# Patient Record
Sex: FEMALE | Race: WHITE | NOT HISPANIC OR LATINO | Employment: UNEMPLOYED | URBAN - METROPOLITAN AREA
[De-identification: names, ages, dates, MRNs, and addresses within clinical notes are randomized per-mention and may not be internally consistent; named-entity substitution may affect disease eponyms.]

---

## 2018-03-05 ENCOUNTER — HOSPITAL ENCOUNTER (EMERGENCY)
Facility: HOSPITAL | Age: 28
Discharge: HOME | End: 2018-03-05
Attending: EMERGENCY MEDICINE
Payer: MEDICAID

## 2018-03-05 VITALS
DIASTOLIC BLOOD PRESSURE: 81 MMHG | OXYGEN SATURATION: 97 % | SYSTOLIC BLOOD PRESSURE: 125 MMHG | TEMPERATURE: 98 F | HEIGHT: 62 IN | WEIGHT: 145 LBS | RESPIRATION RATE: 17 BRPM | BODY MASS INDEX: 26.68 KG/M2 | HEART RATE: 89 BPM

## 2018-03-05 DIAGNOSIS — E86.0 DEHYDRATION WITH HYPONATREMIA: ICD-10-CM

## 2018-03-05 DIAGNOSIS — R11.2 NON-INTRACTABLE VOMITING WITH NAUSEA, UNSPECIFIED VOMITING TYPE: Primary | ICD-10-CM

## 2018-03-05 DIAGNOSIS — E87.1 DEHYDRATION WITH HYPONATREMIA: ICD-10-CM

## 2018-03-05 LAB
ALBUMIN SERPL BCP-MCNC: 4.3 G/DL
ALP SERPL-CCNC: 72 U/L
ALT SERPL W/O P-5'-P-CCNC: 18 U/L
ANION GAP SERPL CALC-SCNC: 13 MMOL/L
AST SERPL-CCNC: 28 U/L
B-HCG UR QL: NEGATIVE
BASOPHILS # BLD AUTO: 0.07 K/UL
BASOPHILS NFR BLD: 0.4 %
BILIRUB SERPL-MCNC: 0.6 MG/DL
BILIRUB UR QL STRIP: NEGATIVE
BUN SERPL-MCNC: 18 MG/DL
CALCIUM SERPL-MCNC: 9.3 MG/DL
CHLORIDE SERPL-SCNC: 92 MMOL/L
CLARITY UR: CLEAR
CO2 SERPL-SCNC: 24 MMOL/L
COLOR UR: YELLOW
CREAT SERPL-MCNC: 0.8 MG/DL
DIFFERENTIAL METHOD: ABNORMAL
EOSINOPHIL # BLD AUTO: 0.1 K/UL
EOSINOPHIL NFR BLD: 0.9 %
ERYTHROCYTE [DISTWIDTH] IN BLOOD BY AUTOMATED COUNT: 14.2 %
EST. GFR  (AFRICAN AMERICAN): >60 ML/MIN/1.73 M^2
EST. GFR  (NON AFRICAN AMERICAN): >60 ML/MIN/1.73 M^2
GLUCOSE SERPL-MCNC: 108 MG/DL
GLUCOSE UR QL STRIP: NEGATIVE
HCT VFR BLD AUTO: 47.1 %
HGB BLD-MCNC: 16.5 G/DL
HGB UR QL STRIP: NEGATIVE
INR PPP: 1
KETONES UR QL STRIP: NEGATIVE
LEUKOCYTE ESTERASE UR QL STRIP: NEGATIVE
LIPASE SERPL-CCNC: 31 U/L
LYMPHOCYTES # BLD AUTO: 4.6 K/UL
LYMPHOCYTES NFR BLD: 28.7 %
MCH RBC QN AUTO: 29.2 PG
MCHC RBC AUTO-ENTMCNC: 35 G/DL
MCV RBC AUTO: 83 FL
MONOCYTES # BLD AUTO: 1.4 K/UL
MONOCYTES NFR BLD: 8.4 %
NEUTROPHILS # BLD AUTO: 9.9 K/UL
NEUTROPHILS NFR BLD: 61.6 %
NITRITE UR QL STRIP: NEGATIVE
PH UR STRIP: 7 [PH] (ref 5–8)
PLATELET # BLD AUTO: 348 K/UL
PMV BLD AUTO: 11.5 FL
POTASSIUM SERPL-SCNC: 4 MMOL/L
PROT SERPL-MCNC: 7.6 G/DL
PROT UR QL STRIP: NEGATIVE
PROTHROMBIN TIME: 10.7 SEC
RBC # BLD AUTO: 5.66 M/UL
SODIUM SERPL-SCNC: 129 MMOL/L
SP GR UR STRIP: 1.01 (ref 1–1.03)
URN SPEC COLLECT METH UR: NORMAL
UROBILINOGEN UR STRIP-ACNC: 1 EU/DL
WBC # BLD AUTO: 16.02 K/UL

## 2018-03-05 PROCEDURE — 99283 EMERGENCY DEPT VISIT LOW MDM: CPT | Mod: 25

## 2018-03-05 PROCEDURE — 96361 HYDRATE IV INFUSION ADD-ON: CPT

## 2018-03-05 PROCEDURE — 63600175 PHARM REV CODE 636 W HCPCS: Performed by: NURSE PRACTITIONER

## 2018-03-05 PROCEDURE — 96374 THER/PROPH/DIAG INJ IV PUSH: CPT

## 2018-03-05 PROCEDURE — 25000003 PHARM REV CODE 250: Performed by: NURSE PRACTITIONER

## 2018-03-05 PROCEDURE — 81025 URINE PREGNANCY TEST: CPT

## 2018-03-05 PROCEDURE — 83690 ASSAY OF LIPASE: CPT

## 2018-03-05 PROCEDURE — 85025 COMPLETE CBC W/AUTO DIFF WBC: CPT

## 2018-03-05 PROCEDURE — 81003 URINALYSIS AUTO W/O SCOPE: CPT

## 2018-03-05 PROCEDURE — 85610 PROTHROMBIN TIME: CPT

## 2018-03-05 PROCEDURE — 80053 COMPREHEN METABOLIC PANEL: CPT

## 2018-03-05 RX ORDER — ONDANSETRON 4 MG/1
4 TABLET, FILM COATED ORAL EVERY 8 HOURS PRN
Qty: 12 TABLET | Refills: 0 | Status: SHIPPED | OUTPATIENT
Start: 2018-03-05 | End: 2018-12-31

## 2018-03-05 RX ORDER — CLONIDINE 0.2 MG/24H
1 PATCH, EXTENDED RELEASE TRANSDERMAL
COMMUNITY
End: 2018-12-31

## 2018-03-05 RX ORDER — SODIUM CHLORIDE 9 MG/ML
1000 INJECTION, SOLUTION INTRAVENOUS
Status: COMPLETED | OUTPATIENT
Start: 2018-03-05 | End: 2018-03-05

## 2018-03-05 RX ORDER — ONDANSETRON 2 MG/ML
8 INJECTION INTRAMUSCULAR; INTRAVENOUS
Status: COMPLETED | OUTPATIENT
Start: 2018-03-05 | End: 2018-03-05

## 2018-03-05 RX ORDER — AMLODIPINE BESYLATE 5 MG/1
5 TABLET ORAL DAILY
COMMUNITY
End: 2018-12-31

## 2018-03-05 RX ADMIN — SODIUM CHLORIDE 1000 ML: 0.9 INJECTION, SOLUTION INTRAVENOUS at 04:03

## 2018-03-05 RX ADMIN — ONDANSETRON 8 MG: 2 INJECTION INTRAMUSCULAR; INTRAVENOUS at 04:03
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
## 2018-09-04 ENCOUNTER — HOSPITAL ENCOUNTER (EMERGENCY)
Facility: HOSPITAL | Age: 28
Discharge: HOME | End: 2018-09-05
Attending: EMERGENCY MEDICINE
Payer: MEDICAID

## 2018-09-04 DIAGNOSIS — T40.1X1A ACCIDENTAL OVERDOSE OF HEROIN, INITIAL ENCOUNTER: Primary | ICD-10-CM

## 2018-09-04 DIAGNOSIS — J96.90 RESPIRATORY FAILURE: ICD-10-CM

## 2018-09-04 DIAGNOSIS — J96.01 ACUTE RESPIRATORY FAILURE WITH HYPOXIA: ICD-10-CM

## 2018-09-04 DIAGNOSIS — T50.901A OVERDOSE: ICD-10-CM

## 2018-09-04 PROCEDURE — 96374 THER/PROPH/DIAG INJ IV PUSH: CPT

## 2018-09-04 PROCEDURE — 99285 EMERGENCY DEPT VISIT HI MDM: CPT | Mod: 25

## 2018-09-04 PROCEDURE — 80053 COMPREHEN METABOLIC PANEL: CPT

## 2018-09-04 PROCEDURE — 63600175 PHARM REV CODE 636 W HCPCS: Performed by: EMERGENCY MEDICINE

## 2018-09-04 PROCEDURE — 93010 ELECTROCARDIOGRAM REPORT: CPT | Mod: ,,, | Performed by: INTERNAL MEDICINE

## 2018-09-04 PROCEDURE — 25000003 PHARM REV CODE 250: Performed by: EMERGENCY MEDICINE

## 2018-09-04 PROCEDURE — 85025 COMPLETE CBC W/AUTO DIFF WBC: CPT

## 2018-09-04 PROCEDURE — 63600175 PHARM REV CODE 636 W HCPCS

## 2018-09-04 PROCEDURE — 93005 ELECTROCARDIOGRAM TRACING: CPT

## 2018-09-04 RX ORDER — NALOXONE HCL 0.4 MG/ML
2 VIAL (ML) INJECTION
Status: COMPLETED | OUTPATIENT
Start: 2018-09-04 | End: 2018-09-04

## 2018-09-04 RX ADMIN — NALOXONE HYDROCHLORIDE 2 MG: 0.4 INJECTION, SOLUTION INTRAMUSCULAR; INTRAVENOUS; SUBCUTANEOUS at 11:09

## 2018-09-04 RX ADMIN — SODIUM CHLORIDE 1000 ML: 0.9 INJECTION, SOLUTION INTRAVENOUS at 11:09

## 2018-09-05 VITALS
OXYGEN SATURATION: 94 % | HEART RATE: 75 BPM | TEMPERATURE: 99 F | DIASTOLIC BLOOD PRESSURE: 73 MMHG | SYSTOLIC BLOOD PRESSURE: 131 MMHG | RESPIRATION RATE: 16 BRPM

## 2018-09-05 LAB
ALBUMIN SERPL BCP-MCNC: 3.6 G/DL
ALP SERPL-CCNC: 71 U/L
ALT SERPL W/O P-5'-P-CCNC: 16 U/L
AMPHET+METHAMPHET UR QL: NEGATIVE
ANION GAP SERPL CALC-SCNC: 15 MMOL/L
AST SERPL-CCNC: 24 U/L
B-HCG UR QL: NEGATIVE
BARBITURATES UR QL SCN>200 NG/ML: NEGATIVE
BASOPHILS # BLD AUTO: 0.06 K/UL
BASOPHILS NFR BLD: 0.6 %
BENZODIAZ UR QL SCN>200 NG/ML: NEGATIVE
BILIRUB SERPL-MCNC: 0.2 MG/DL
BILIRUB UR QL STRIP: NEGATIVE
BUN SERPL-MCNC: 10 MG/DL
BZE UR QL SCN: NEGATIVE
CALCIUM SERPL-MCNC: 8.3 MG/DL
CANNABINOIDS UR QL SCN: NEGATIVE
CHLORIDE SERPL-SCNC: 107 MMOL/L
CLARITY UR: CLEAR
CO2 SERPL-SCNC: 19 MMOL/L
COLOR UR: YELLOW
CREAT SERPL-MCNC: 0.8 MG/DL
CREAT UR-MCNC: 7.2 MG/DL
DIFFERENTIAL METHOD: ABNORMAL
EOSINOPHIL # BLD AUTO: 0.2 K/UL
EOSINOPHIL NFR BLD: 2.1 %
ERYTHROCYTE [DISTWIDTH] IN BLOOD BY AUTOMATED COUNT: 16 %
EST. GFR  (AFRICAN AMERICAN): >60 ML/MIN/1.73 M^2
EST. GFR  (NON AFRICAN AMERICAN): >60 ML/MIN/1.73 M^2
GLUCOSE SERPL-MCNC: 130 MG/DL
GLUCOSE UR QL STRIP: NEGATIVE
HCT VFR BLD AUTO: 39.2 %
HGB BLD-MCNC: 12.7 G/DL
HGB UR QL STRIP: ABNORMAL
KETONES UR QL STRIP: NEGATIVE
LEUKOCYTE ESTERASE UR QL STRIP: ABNORMAL
LYMPHOCYTES # BLD AUTO: 5.1 K/UL
LYMPHOCYTES NFR BLD: 50.4 %
MCH RBC QN AUTO: 25.3 PG
MCHC RBC AUTO-ENTMCNC: 32.4 G/DL
MCV RBC AUTO: 78 FL
METHADONE UR QL SCN>300 NG/ML: NEGATIVE
MICROSCOPIC COMMENT: NORMAL
MONOCYTES # BLD AUTO: 0.6 K/UL
MONOCYTES NFR BLD: 5.9 %
NEUTROPHILS # BLD AUTO: 4.1 K/UL
NEUTROPHILS NFR BLD: 41 %
NITRITE UR QL STRIP: NEGATIVE
OPIATES UR QL SCN: ABNORMAL
PCP UR QL SCN>25 NG/ML: NEGATIVE
PH UR STRIP: 7 [PH] (ref 5–8)
PLATELET # BLD AUTO: 339 K/UL
PMV BLD AUTO: 10.4 FL
POTASSIUM SERPL-SCNC: 3.2 MMOL/L
PROT SERPL-MCNC: 7 G/DL
PROT UR QL STRIP: ABNORMAL
RBC # BLD AUTO: 5.02 M/UL
RBC #/AREA URNS HPF: 4 /HPF (ref 0–4)
SODIUM SERPL-SCNC: 141 MMOL/L
SP GR UR STRIP: <=1.005 (ref 1–1.03)
SQUAMOUS #/AREA URNS HPF: 2 /HPF
TOXICOLOGY INFORMATION: ABNORMAL
URN SPEC COLLECT METH UR: ABNORMAL
UROBILINOGEN UR STRIP-ACNC: NEGATIVE EU/DL
WBC # BLD AUTO: 10.01 K/UL
WBC #/AREA URNS HPF: 1 /HPF (ref 0–5)

## 2018-09-05 PROCEDURE — 80307 DRUG TEST PRSMV CHEM ANLYZR: CPT

## 2018-09-05 PROCEDURE — 81000 URINALYSIS NONAUTO W/SCOPE: CPT | Mod: 59

## 2018-09-05 PROCEDURE — 81025 URINE PREGNANCY TEST: CPT
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
## 2018-10-19 ENCOUNTER — HOSPITAL ENCOUNTER (EMERGENCY)
Facility: HOSPITAL | Age: 28
Discharge: HOME | End: 2018-10-19
Attending: EMERGENCY MEDICINE
Payer: MEDICAID

## 2018-10-19 VITALS
OXYGEN SATURATION: 98 % | DIASTOLIC BLOOD PRESSURE: 96 MMHG | TEMPERATURE: 98 F | HEART RATE: 84 BPM | RESPIRATION RATE: 18 BRPM | SYSTOLIC BLOOD PRESSURE: 168 MMHG

## 2018-10-19 DIAGNOSIS — R10.2 PELVIC CRAMPING: Primary | ICD-10-CM

## 2018-10-19 LAB
B-HCG UR QL: NEGATIVE
BACTERIA #/AREA URNS HPF: NORMAL /HPF
BILIRUB UR QL STRIP: NEGATIVE
CLARITY UR: CLEAR
COLOR UR: YELLOW
GLUCOSE UR QL STRIP: NEGATIVE
HGB UR QL STRIP: NEGATIVE
KETONES UR QL STRIP: NEGATIVE
LEUKOCYTE ESTERASE UR QL STRIP: ABNORMAL
MICROSCOPIC COMMENT: NORMAL
NITRITE UR QL STRIP: NEGATIVE
PH UR STRIP: 6 [PH] (ref 5–8)
PROT UR QL STRIP: NEGATIVE
SP GR UR STRIP: <=1.005 (ref 1–1.03)
SQUAMOUS #/AREA URNS HPF: 5 /HPF
URN SPEC COLLECT METH UR: ABNORMAL
UROBILINOGEN UR STRIP-ACNC: NEGATIVE EU/DL
WBC #/AREA URNS HPF: 3 /HPF (ref 0–5)

## 2018-10-19 PROCEDURE — 81025 URINE PREGNANCY TEST: CPT

## 2018-10-19 PROCEDURE — 99283 EMERGENCY DEPT VISIT LOW MDM: CPT

## 2018-10-19 PROCEDURE — 81000 URINALYSIS NONAUTO W/SCOPE: CPT

## 2018-10-19 RX ORDER — METOCLOPRAMIDE 10 MG/1
10 TABLET ORAL EVERY 6 HOURS PRN
Qty: 20 TABLET | Refills: 0 | Status: SHIPPED | OUTPATIENT
Start: 2018-10-19 | End: 2018-12-31
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
## 2018-10-19 NOTE — ED NOTES
Bed:  02  Expected date:   Expected time:   Means of arrival:   Comments:     Giuseppe Mo RN  10/19/18 1030

## 2018-10-23 ENCOUNTER — TELEPHONE (OUTPATIENT)
Dept: OBSTETRICS AND GYNECOLOGY | Facility: CLINIC | Age: 28
End: 2018-10-23

## 2018-10-23 DIAGNOSIS — Z34.90 EARLY STAGE OF PREGNANCY: Primary | ICD-10-CM

## 2018-10-23 NOTE — TELEPHONE ENCOUNTER
Spoke to patient. Patient is requesting hcg bloodwork orders, thinks she may be pregnant. Patient is bleeding now but has had periods during pregnancy in the past and also has had negative UPT during pregnancy. Advised patient I would send a message to see about getting orders for the lab and call her back. Patient verbalized understanding.

## 2018-10-23 NOTE — TELEPHONE ENCOUNTER
----- Message from Yara Sharpjose alejandro sent at 10/23/2018  4:24 PM CDT -----  Contact: self [phone]  States that she thinks she may be pregnant but from past pregnancy she will not test positive with a urine pregnacy. States that she would like a pregnancy blood test order. States that she is also on her period now but has had periods when pregnant in the past. Please call back at [phone]//thank you acc

## 2018-10-24 ENCOUNTER — TELEPHONE (OUTPATIENT)
Dept: OBSTETRICS AND GYNECOLOGY | Facility: CLINIC | Age: 28
End: 2018-10-24

## 2018-10-24 NOTE — TELEPHONE ENCOUNTER
Spoke to patient. Assisted patient with scheduling lab appointment for 10/25/18. Patient verbalized understanding.

## 2018-10-24 NOTE — TELEPHONE ENCOUNTER
----- Message from Alia Torres sent at 10/24/2018  3:34 PM CDT -----  Pt at [phone]//states she is returning your call//please call again//thanks/kenny

## 2018-10-24 NOTE — TELEPHONE ENCOUNTER
Attempted to call patient to notify her that Dr. Giordano put the orders in for labwork and we can schedule her. No answer, no voicemail. Unable to leave message.

## 2018-10-25 ENCOUNTER — LAB VISIT (OUTPATIENT)
Dept: LAB | Facility: HOSPITAL | Age: 28
End: 2018-10-25
Attending: OBSTETRICS & GYNECOLOGY
Payer: MEDICAID

## 2018-10-25 DIAGNOSIS — Z34.90 EARLY STAGE OF PREGNANCY: ICD-10-CM

## 2018-10-25 LAB — HCG INTACT+B SERPL-ACNC: <1.2 MIU/ML

## 2018-10-25 PROCEDURE — 84702 CHORIONIC GONADOTROPIN TEST: CPT

## 2018-10-25 PROCEDURE — 36415 COLL VENOUS BLD VENIPUNCTURE: CPT

## 2018-10-26 ENCOUNTER — PATIENT MESSAGE (OUTPATIENT)
Dept: OBSTETRICS AND GYNECOLOGY | Facility: CLINIC | Age: 28
End: 2018-10-26

## 2018-11-08 ENCOUNTER — TELEPHONE (OUTPATIENT)
Dept: OBSTETRICS AND GYNECOLOGY | Facility: CLINIC | Age: 28
End: 2018-11-08

## 2018-12-27 ENCOUNTER — TELEPHONE (OUTPATIENT)
Dept: OBSTETRICS AND GYNECOLOGY | Facility: CLINIC | Age: 28
End: 2018-12-27

## 2018-12-27 NOTE — TELEPHONE ENCOUNTER
Pt. Called and she has an appointment today to see ANNE Cee at 3:30. Patient wanted to know if she can come later at 4:00pm. Let patient know that we do not have any later appointments. Offered to reschedule to a different day. Patient declined and will keep her appointment for today at 3:30pm.

## 2018-12-31 ENCOUNTER — LAB VISIT (OUTPATIENT)
Dept: LAB | Facility: HOSPITAL | Age: 28
End: 2018-12-31
Attending: ADVANCED PRACTICE MIDWIFE
Payer: MEDICAID

## 2018-12-31 ENCOUNTER — INITIAL PRENATAL (OUTPATIENT)
Dept: OBSTETRICS AND GYNECOLOGY | Facility: CLINIC | Age: 28
End: 2018-12-31
Payer: MEDICAID

## 2018-12-31 ENCOUNTER — TELEPHONE (OUTPATIENT)
Dept: OBSTETRICS AND GYNECOLOGY | Facility: CLINIC | Age: 28
End: 2018-12-31

## 2018-12-31 ENCOUNTER — PROCEDURE VISIT (OUTPATIENT)
Dept: OBSTETRICS AND GYNECOLOGY | Facility: CLINIC | Age: 28
End: 2018-12-31
Payer: MEDICAID

## 2018-12-31 VITALS
BODY MASS INDEX: 23.79 KG/M2 | SYSTOLIC BLOOD PRESSURE: 126 MMHG | DIASTOLIC BLOOD PRESSURE: 72 MMHG | WEIGHT: 130.06 LBS

## 2018-12-31 DIAGNOSIS — O09.299 HISTORY OF GESTATIONAL DIABETES IN PRIOR PREGNANCY, CURRENTLY PREGNANT: ICD-10-CM

## 2018-12-31 DIAGNOSIS — Z32.01 POSITIVE PREGNANCY TEST: Primary | ICD-10-CM

## 2018-12-31 DIAGNOSIS — Z32.01 PREGNANCY TEST POSITIVE: Primary | ICD-10-CM

## 2018-12-31 DIAGNOSIS — O09.291 HISTORY OF POSTPARTUM HEMORRHAGE, CURRENTLY PREGNANT IN FIRST TRIMESTER: ICD-10-CM

## 2018-12-31 DIAGNOSIS — B18.2 CHRONIC HEPATITIS C WITHOUT HEPATIC COMA: ICD-10-CM

## 2018-12-31 DIAGNOSIS — O09.299 HISTORY OF OLIGOHYDRAMNIOS IN PRIOR PREGNANCY, CURRENTLY PREGNANT: ICD-10-CM

## 2018-12-31 DIAGNOSIS — Z32.01 PREGNANCY TEST POSITIVE: ICD-10-CM

## 2018-12-31 DIAGNOSIS — Z86.32 HISTORY OF GESTATIONAL DIABETES IN PRIOR PREGNANCY, CURRENTLY PREGNANT: ICD-10-CM

## 2018-12-31 DIAGNOSIS — Z32.01 POSITIVE PREGNANCY TEST: ICD-10-CM

## 2018-12-31 DIAGNOSIS — F19.11 HISTORY OF DRUG ABUSE: ICD-10-CM

## 2018-12-31 LAB
ABO + RH BLD: NORMAL
ANION GAP SERPL CALC-SCNC: 8 MMOL/L
BASOPHILS # BLD AUTO: 0.06 K/UL
BASOPHILS NFR BLD: 0.6 %
BLD GP AB SCN CELLS X3 SERPL QL: NORMAL
BUN SERPL-MCNC: 6 MG/DL
C TRACH DNA SPEC QL NAA+PROBE: NOT DETECTED
CALCIUM SERPL-MCNC: 9.6 MG/DL
CHLORIDE SERPL-SCNC: 104 MMOL/L
CO2 SERPL-SCNC: 26 MMOL/L
CREAT SERPL-MCNC: 0.7 MG/DL
DIFFERENTIAL METHOD: ABNORMAL
EOSINOPHIL # BLD AUTO: 0.1 K/UL
EOSINOPHIL NFR BLD: 0.9 %
ERYTHROCYTE [DISTWIDTH] IN BLOOD BY AUTOMATED COUNT: 17.4 %
EST. GFR  (AFRICAN AMERICAN): >60 ML/MIN/1.73 M^2
EST. GFR  (NON AFRICAN AMERICAN): >60 ML/MIN/1.73 M^2
GLUCOSE SERPL-MCNC: 68 MG/DL
HCT VFR BLD AUTO: 42.2 %
HGB BLD-MCNC: 13.2 G/DL
IMM GRANULOCYTES # BLD AUTO: 0.03 K/UL
IMM GRANULOCYTES NFR BLD AUTO: 0.3 %
LYMPHOCYTES # BLD AUTO: 3.3 K/UL
LYMPHOCYTES NFR BLD: 31.3 %
MCH RBC QN AUTO: 25.6 PG
MCHC RBC AUTO-ENTMCNC: 31.3 G/DL
MCV RBC AUTO: 82 FL
MONOCYTES # BLD AUTO: 0.7 K/UL
MONOCYTES NFR BLD: 6.8 %
N GONORRHOEA DNA SPEC QL NAA+PROBE: NOT DETECTED
NEUTROPHILS # BLD AUTO: 6.2 K/UL
NEUTROPHILS NFR BLD: 60.1 %
NRBC BLD-RTO: 0 /100 WBC
PLATELET # BLD AUTO: 369 K/UL
PMV BLD AUTO: 11.9 FL
POTASSIUM SERPL-SCNC: 3.4 MMOL/L
RBC # BLD AUTO: 5.16 M/UL
SODIUM SERPL-SCNC: 138 MMOL/L
WBC # BLD AUTO: 10.37 K/UL

## 2018-12-31 PROCEDURE — 76801 OB US < 14 WKS SINGLE FETUS: CPT | Mod: PBBFAC | Performed by: OBSTETRICS & GYNECOLOGY

## 2018-12-31 PROCEDURE — 99204 OFFICE O/P NEW MOD 45 MIN: CPT | Mod: TH,S$PBB,, | Performed by: ADVANCED PRACTICE MIDWIFE

## 2018-12-31 PROCEDURE — 99999 PR PBB SHADOW E&M-EST. PATIENT-LVL III: CPT | Mod: PBBFAC,,, | Performed by: ADVANCED PRACTICE MIDWIFE

## 2018-12-31 PROCEDURE — 86901 BLOOD TYPING SEROLOGIC RH(D): CPT

## 2018-12-31 PROCEDURE — 36415 COLL VENOUS BLD VENIPUNCTURE: CPT

## 2018-12-31 PROCEDURE — 85025 COMPLETE CBC W/AUTO DIFF WBC: CPT

## 2018-12-31 PROCEDURE — 80048 BASIC METABOLIC PNL TOTAL CA: CPT

## 2018-12-31 PROCEDURE — 87522 HEPATITIS C REVRS TRNSCRPJ: CPT

## 2018-12-31 PROCEDURE — 76801 OB US < 14 WKS SINGLE FETUS: CPT | Mod: 26,S$PBB,, | Performed by: OBSTETRICS & GYNECOLOGY

## 2018-12-31 PROCEDURE — 88175 CYTOPATH C/V AUTO FLUID REDO: CPT

## 2018-12-31 PROCEDURE — 87491 CHLMYD TRACH DNA AMP PROBE: CPT

## 2018-12-31 PROCEDURE — 86762 RUBELLA ANTIBODY: CPT

## 2018-12-31 PROCEDURE — 87086 URINE CULTURE/COLONY COUNT: CPT

## 2018-12-31 PROCEDURE — 99213 OFFICE O/P EST LOW 20 MIN: CPT | Mod: PBBFAC,TH,25 | Performed by: ADVANCED PRACTICE MIDWIFE

## 2018-12-31 PROCEDURE — 86703 HIV-1/HIV-2 1 RESULT ANTBDY: CPT

## 2018-12-31 PROCEDURE — 80074 ACUTE HEPATITIS PANEL: CPT

## 2018-12-31 PROCEDURE — 86592 SYPHILIS TEST NON-TREP QUAL: CPT

## 2018-12-31 PROCEDURE — 76801 PR US, OB <14WKS, TRANSABD, SINGLE GESTATION: ICD-10-PCS | Mod: 26,S$PBB,, | Performed by: OBSTETRICS & GYNECOLOGY

## 2018-12-31 NOTE — TELEPHONE ENCOUNTER
----- Message from Jia Blake sent at 12/31/2018 10:36 AM CST -----  Contact: pt  She is calling stating that she will be 5-10 mins late,please advise [phone] (home)
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
## 2019-01-01 LAB — BACTERIA UR CULT: NO GROWTH

## 2019-01-02 ENCOUNTER — TELEPHONE (OUTPATIENT)
Dept: GYNECOLOGIC ONCOLOGY | Facility: CLINIC | Age: 29
End: 2019-01-02

## 2019-01-02 LAB
HAV IGM SERPL QL IA: NEGATIVE
HBV CORE IGM SERPL QL IA: NEGATIVE
HBV SURFACE AG SERPL QL IA: NEGATIVE
HBV SURFACE AG SERPL QL IA: NEGATIVE
HCV AB SERPL QL IA: POSITIVE
HIV 1+2 AB+HIV1 P24 AG SERPL QL IA: NEGATIVE
RPR SER QL: NORMAL
RUBV IGG SER-ACNC: 48.2 IU/ML
RUBV IGG SER-IMP: REACTIVE

## 2019-01-02 NOTE — TELEPHONE ENCOUNTER
----- Message from Lonny Zuniga sent at 1/2/2019 11:46 AM CST -----  Contact: Pt   States she missed a call/ pt is being referred to [facility] and can be reached at [phone]//thanks/dbw

## 2019-01-04 LAB
HCV RNA SERPL NAA+PROBE-LOG IU: <1.08 LOG (10) IU/ML
HCV RNA SERPL QL NAA+PROBE: NOT DETECTED IU/ML
HCV RNA SPEC NAA+PROBE-ACNC: <12 IU/ML

## 2019-01-07 ENCOUNTER — TELEPHONE (OUTPATIENT)
Dept: OBSTETRICS AND GYNECOLOGY | Facility: CLINIC | Age: 29
End: 2019-01-07

## 2019-01-07 ENCOUNTER — TELEPHONE (OUTPATIENT)
Dept: HEMATOLOGY/ONCOLOGY | Facility: CLINIC | Age: 29
End: 2019-01-07

## 2019-01-07 NOTE — TELEPHONE ENCOUNTER
----- Message from Kimberly Keating sent at 1/7/2019  1:42 PM CST -----  Contact: pt  The pt request a return call, no additional info given, the pt can be reached at [phone]///thxMW

## 2019-01-07 NOTE — TELEPHONE ENCOUNTER
----- Message from Bryce Hendricks sent at 1/7/2019 11:48 AM CST -----  Contact: pt   Would like cb to discuss upcoming appt.         ..[phone] (home)

## 2019-01-18 ENCOUNTER — TELEPHONE (OUTPATIENT)
Dept: HEMATOLOGY/ONCOLOGY | Facility: CLINIC | Age: 29
End: 2019-01-18

## 2019-02-08 ENCOUNTER — ROUTINE PRENATAL (OUTPATIENT)
Dept: OBSTETRICS AND GYNECOLOGY | Facility: CLINIC | Age: 29
End: 2019-02-08
Payer: MEDICAID

## 2019-02-08 VITALS
WEIGHT: 127.88 LBS | DIASTOLIC BLOOD PRESSURE: 84 MMHG | BODY MASS INDEX: 23.39 KG/M2 | SYSTOLIC BLOOD PRESSURE: 132 MMHG

## 2019-02-08 DIAGNOSIS — Z34.02 ENCOUNTER FOR PRENATAL CARE IN SECOND TRIMESTER OF FIRST PREGNANCY: ICD-10-CM

## 2019-02-08 DIAGNOSIS — I10 CHRONIC HYPERTENSION: ICD-10-CM

## 2019-02-08 PROCEDURE — 99213 OFFICE O/P EST LOW 20 MIN: CPT | Mod: PBBFAC,TH | Performed by: ADVANCED PRACTICE MIDWIFE

## 2019-02-08 PROCEDURE — 99999 PR PBB SHADOW E&M-EST. PATIENT-LVL III: CPT | Mod: PBBFAC,,, | Performed by: ADVANCED PRACTICE MIDWIFE

## 2019-02-08 PROCEDURE — 99213 OFFICE O/P EST LOW 20 MIN: CPT | Mod: TH,S$PBB,, | Performed by: ADVANCED PRACTICE MIDWIFE

## 2019-02-08 PROCEDURE — 99213 PR OFFICE/OUTPT VISIT, EST, LEVL III, 20-29 MIN: ICD-10-PCS | Mod: TH,S$PBB,, | Performed by: ADVANCED PRACTICE MIDWIFE

## 2019-02-08 PROCEDURE — 99999 PR PBB SHADOW E&M-EST. PATIENT-LVL III: ICD-10-PCS | Mod: PBBFAC,,, | Performed by: ADVANCED PRACTICE MIDWIFE

## 2019-02-08 RX ORDER — PANTOPRAZOLE SODIUM 40 MG/1
40 TABLET, DELAYED RELEASE ORAL DAILY
Qty: 30 TABLET | Refills: 1 | Status: SHIPPED | OUTPATIENT
Start: 2019-02-08 | End: 2019-11-05

## 2019-02-08 RX ORDER — ONDANSETRON 4 MG/1
4 TABLET, ORALLY DISINTEGRATING ORAL EVERY 6 HOURS PRN
Qty: 20 TABLET | Refills: 0 | Status: SHIPPED | OUTPATIENT
Start: 2019-02-08 | End: 2019-03-27

## 2019-02-12 PROBLEM — Z34.02 ENCOUNTER FOR PRENATAL CARE IN SECOND TRIMESTER OF FIRST PREGNANCY: Status: ACTIVE | Noted: 2019-02-12

## 2019-02-12 PROBLEM — I10 CHRONIC HYPERTENSION: Status: ACTIVE | Noted: 2019-02-12

## 2019-02-12 NOTE — PROGRESS NOTES
Here today for routine F/U OB visit.  Missed appointment with [facility], advised to reschedule, contact # to schedule given to patient.  Patient states she has history of HTN and was on meds.  States takes BP at home and that it has been high.  Advised to tke BID and bring readings in with next visit.  Advised if HA or visual changes accompany high readings to report to ER.  Advised to start ASA 81 mg daily.  Patient also C/O nause and vomiting.  3# weight loss noted.  A-Z nausea tips reviewed, zofran to pharmacy, dietary changes to assist with nausea reviewed.  To RTC 1 week to review BP readings and check weight.  Declined flu today as is not feeling well

## 2019-03-26 ENCOUNTER — HOSPITAL ENCOUNTER (EMERGENCY)
Facility: HOSPITAL | Age: 29
Discharge: HOME | End: 2019-03-26
Attending: EMERGENCY MEDICINE
Payer: MEDICAID

## 2019-03-26 VITALS
SYSTOLIC BLOOD PRESSURE: 124 MMHG | HEIGHT: 62 IN | OXYGEN SATURATION: 98 % | WEIGHT: 139.75 LBS | TEMPERATURE: 98 F | DIASTOLIC BLOOD PRESSURE: 78 MMHG | BODY MASS INDEX: 25.72 KG/M2 | HEART RATE: 68 BPM | RESPIRATION RATE: 16 BRPM

## 2019-03-26 DIAGNOSIS — R82.71 BACTERIA IN URINE: ICD-10-CM

## 2019-03-26 DIAGNOSIS — F13.930 BENZODIAZEPINE WITHDRAWAL WITHOUT COMPLICATION: Primary | ICD-10-CM

## 2019-03-26 LAB
ALBUMIN SERPL BCP-MCNC: 2.6 G/DL (ref 3.5–5.2)
ALP SERPL-CCNC: 63 U/L (ref 55–135)
ALT SERPL W/O P-5'-P-CCNC: 11 U/L (ref 10–44)
ANION GAP SERPL CALC-SCNC: 9 MMOL/L (ref 8–16)
AST SERPL-CCNC: 20 U/L (ref 10–40)
BACTERIA #/AREA URNS HPF: NORMAL /HPF
BASOPHILS # BLD AUTO: 0.03 K/UL (ref 0–0.2)
BASOPHILS NFR BLD: 0.3 % (ref 0–1.9)
BILIRUB SERPL-MCNC: 0.2 MG/DL (ref 0.1–1)
BILIRUB UR QL STRIP: NEGATIVE
BUN SERPL-MCNC: 6 MG/DL (ref 6–20)
CALCIUM SERPL-MCNC: 8.4 MG/DL (ref 8.7–10.5)
CHLORIDE SERPL-SCNC: 105 MMOL/L (ref 95–110)
CLARITY UR: CLEAR
CO2 SERPL-SCNC: 24 MMOL/L (ref 23–29)
COLOR UR: YELLOW
CREAT SERPL-MCNC: 0.6 MG/DL (ref 0.5–1.4)
DIFFERENTIAL METHOD: ABNORMAL
EOSINOPHIL # BLD AUTO: 0.2 K/UL (ref 0–0.5)
EOSINOPHIL NFR BLD: 1.7 % (ref 0–8)
ERYTHROCYTE [DISTWIDTH] IN BLOOD BY AUTOMATED COUNT: 16.2 % (ref 11.5–14.5)
EST. GFR  (AFRICAN AMERICAN): >60 ML/MIN/1.73 M^2
EST. GFR  (NON AFRICAN AMERICAN): >60 ML/MIN/1.73 M^2
GLUCOSE SERPL-MCNC: 77 MG/DL (ref 70–110)
GLUCOSE UR QL STRIP: NEGATIVE
HCT VFR BLD AUTO: 32.8 % (ref 37–48.5)
HGB BLD-MCNC: 10.9 G/DL (ref 12–16)
HGB UR QL STRIP: NEGATIVE
KETONES UR QL STRIP: NEGATIVE
LEUKOCYTE ESTERASE UR QL STRIP: ABNORMAL
LYMPHOCYTES # BLD AUTO: 2.8 K/UL (ref 1–4.8)
LYMPHOCYTES NFR BLD: 28.7 % (ref 18–48)
MCH RBC QN AUTO: 27.5 PG (ref 27–31)
MCHC RBC AUTO-ENTMCNC: 33.2 G/DL (ref 32–36)
MCV RBC AUTO: 83 FL (ref 82–98)
MICROSCOPIC COMMENT: NORMAL
MONOCYTES # BLD AUTO: 0.6 K/UL (ref 0.3–1)
MONOCYTES NFR BLD: 6.1 % (ref 4–15)
NEUTROPHILS # BLD AUTO: 6.1 K/UL (ref 1.8–7.7)
NEUTROPHILS NFR BLD: 63.2 % (ref 38–73)
NITRITE UR QL STRIP: NEGATIVE
PH UR STRIP: 7 [PH] (ref 5–8)
PLATELET # BLD AUTO: 314 K/UL (ref 150–350)
PMV BLD AUTO: 10.3 FL (ref 9.2–12.9)
POTASSIUM SERPL-SCNC: 3.5 MMOL/L (ref 3.5–5.1)
PROT SERPL-MCNC: 5.9 G/DL (ref 6–8.4)
PROT UR QL STRIP: NEGATIVE
RBC # BLD AUTO: 3.96 M/UL (ref 4–5.4)
SODIUM SERPL-SCNC: 138 MMOL/L (ref 136–145)
SP GR UR STRIP: 1.01 (ref 1–1.03)
URN SPEC COLLECT METH UR: ABNORMAL
UROBILINOGEN UR STRIP-ACNC: NEGATIVE EU/DL
WBC # BLD AUTO: 9.64 K/UL (ref 3.9–12.7)
WBC #/AREA URNS HPF: 2 /HPF (ref 0–5)

## 2019-03-26 PROCEDURE — 81000 URINALYSIS NONAUTO W/SCOPE: CPT

## 2019-03-26 PROCEDURE — 25000003 PHARM REV CODE 250: Performed by: EMERGENCY MEDICINE

## 2019-03-26 PROCEDURE — 96360 HYDRATION IV INFUSION INIT: CPT

## 2019-03-26 PROCEDURE — 96361 HYDRATE IV INFUSION ADD-ON: CPT

## 2019-03-26 PROCEDURE — 80053 COMPREHEN METABOLIC PANEL: CPT

## 2019-03-26 PROCEDURE — 85025 COMPLETE CBC W/AUTO DIFF WBC: CPT

## 2019-03-26 PROCEDURE — 99284 EMERGENCY DEPT VISIT MOD MDM: CPT | Mod: 25

## 2019-03-26 RX ORDER — NITROFURANTOIN 25; 75 MG/1; MG/1
100 CAPSULE ORAL 2 TIMES DAILY
Qty: 14 CAPSULE | Refills: 0 | Status: SHIPPED | OUTPATIENT
Start: 2019-03-26 | End: 2019-04-02

## 2019-03-26 RX ORDER — LORAZEPAM 0.5 MG/1
TABLET ORAL
Qty: 26 TABLET | Refills: 0 | Status: SHIPPED | OUTPATIENT
Start: 2019-03-26 | End: 2022-10-31

## 2019-03-26 RX ORDER — LORAZEPAM 1 MG/1
1 TABLET ORAL
Status: COMPLETED | OUTPATIENT
Start: 2019-03-26 | End: 2019-03-26

## 2019-03-26 RX ADMIN — SODIUM CHLORIDE 1000 ML: 0.9 INJECTION, SOLUTION INTRAVENOUS at 01:03

## 2019-03-26 RX ADMIN — LORAZEPAM 1 MG: 1 TABLET ORAL at 01:03

## 2019-03-26 NOTE — ED NOTES
Pt resting in ER stretcher, aaox4, rr e/u, NAD noted. Bed low and locked, call light in reach, side rails up x2. IV fluids infusing.  Pt verbalized understanding of status and POC; denies further needs. Will continue to monitor.
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
## 2019-03-27 ENCOUNTER — ROUTINE PRENATAL (OUTPATIENT)
Dept: OBSTETRICS AND GYNECOLOGY | Facility: CLINIC | Age: 29
End: 2019-03-27
Payer: MEDICAID

## 2019-03-27 ENCOUNTER — PROCEDURE VISIT (OUTPATIENT)
Dept: OBSTETRICS AND GYNECOLOGY | Facility: CLINIC | Age: 29
End: 2019-03-27
Payer: MEDICAID

## 2019-03-27 ENCOUNTER — LAB VISIT (OUTPATIENT)
Dept: LAB | Facility: HOSPITAL | Age: 29
End: 2019-03-27
Attending: MIDWIFE
Payer: MEDICAID

## 2019-03-27 VITALS
DIASTOLIC BLOOD PRESSURE: 90 MMHG | BODY MASS INDEX: 24.92 KG/M2 | WEIGHT: 136.25 LBS | SYSTOLIC BLOOD PRESSURE: 148 MMHG

## 2019-03-27 DIAGNOSIS — O09.292 HISTORY OF GESTATIONAL DIABETES IN PRIOR PREGNANCY, CURRENTLY PREGNANT IN SECOND TRIMESTER: ICD-10-CM

## 2019-03-27 DIAGNOSIS — B18.2 CHRONIC HEPATITIS C WITHOUT HEPATIC COMA: ICD-10-CM

## 2019-03-27 DIAGNOSIS — Z86.32 HISTORY OF GESTATIONAL DIABETES IN PRIOR PREGNANCY, CURRENTLY PREGNANT IN SECOND TRIMESTER: ICD-10-CM

## 2019-03-27 DIAGNOSIS — E07.9 THYROID DISEASE: ICD-10-CM

## 2019-03-27 DIAGNOSIS — Z36.3 ANTENATAL SCREENING FOR MALFORMATION USING ULTRASONICS: ICD-10-CM

## 2019-03-27 DIAGNOSIS — Z3A.19 19 WEEKS GESTATION OF PREGNANCY: ICD-10-CM

## 2019-03-27 DIAGNOSIS — O28.3 ABNORMAL FETAL ULTRASOUND: Primary | ICD-10-CM

## 2019-03-27 DIAGNOSIS — F19.11 HISTORY OF DRUG ABUSE: ICD-10-CM

## 2019-03-27 PROBLEM — O24.419 GESTATIONAL DIABETES: Status: ACTIVE | Noted: 2019-03-27

## 2019-03-27 PROBLEM — Z34.02 ENCOUNTER FOR PRENATAL CARE IN SECOND TRIMESTER OF FIRST PREGNANCY: Status: RESOLVED | Noted: 2019-02-12 | Resolved: 2019-03-27

## 2019-03-27 LAB
AMPHET+METHAMPHET UR QL: NEGATIVE
BARBITURATES UR QL SCN>200 NG/ML: NEGATIVE
BENZODIAZ UR QL SCN>200 NG/ML: NEGATIVE
BZE UR QL SCN: NEGATIVE
CANNABINOIDS UR QL SCN: NEGATIVE
CREAT UR-MCNC: 13 MG/DL (ref 15–325)
METHADONE UR QL SCN>300 NG/ML: NEGATIVE
OPIATES UR QL SCN: NEGATIVE
PCP UR QL SCN>25 NG/ML: NEGATIVE
T4 FREE SERPL-MCNC: 0.94 NG/DL (ref 0.71–1.51)
TOXICOLOGY INFORMATION: ABNORMAL
TSH SERPL DL<=0.005 MIU/L-ACNC: 0.66 UIU/ML (ref 0.4–4)

## 2019-03-27 PROCEDURE — 99213 OFFICE O/P EST LOW 20 MIN: CPT | Mod: TH,S$PBB,, | Performed by: MIDWIFE

## 2019-03-27 PROCEDURE — 76805 PR US, OB 14+WKS, TRANSABD, SINGLE GESTATION: ICD-10-PCS | Mod: 26,S$PBB,, | Performed by: OBSTETRICS & GYNECOLOGY

## 2019-03-27 PROCEDURE — 99213 OFFICE O/P EST LOW 20 MIN: CPT | Mod: PBBFAC,25,TH | Performed by: MIDWIFE

## 2019-03-27 PROCEDURE — 99213 PR OFFICE/OUTPT VISIT, EST, LEVL III, 20-29 MIN: ICD-10-PCS | Mod: TH,S$PBB,, | Performed by: MIDWIFE

## 2019-03-27 PROCEDURE — 99999 PR PBB SHADOW E&M-EST. PATIENT-LVL III: CPT | Mod: PBBFAC,,, | Performed by: MIDWIFE

## 2019-03-27 PROCEDURE — 80307 DRUG TEST PRSMV CHEM ANLYZR: CPT

## 2019-03-27 PROCEDURE — 84443 ASSAY THYROID STIM HORMONE: CPT

## 2019-03-27 PROCEDURE — 87522 HEPATITIS C REVRS TRNSCRPJ: CPT

## 2019-03-27 PROCEDURE — 84439 ASSAY OF FREE THYROXINE: CPT

## 2019-03-27 PROCEDURE — 76805 OB US >/= 14 WKS SNGL FETUS: CPT | Mod: PBBFAC | Performed by: OBSTETRICS & GYNECOLOGY

## 2019-03-27 PROCEDURE — 99999 PR PBB SHADOW E&M-EST. PATIENT-LVL III: ICD-10-PCS | Mod: PBBFAC,,, | Performed by: MIDWIFE

## 2019-03-27 PROCEDURE — 81511 FTL CGEN ABNOR FOUR ANAL: CPT

## 2019-03-27 PROCEDURE — 36415 COLL VENOUS BLD VENIPUNCTURE: CPT

## 2019-03-27 PROCEDURE — 76805 OB US >/= 14 WKS SNGL FETUS: CPT | Mod: 26,S$PBB,, | Performed by: OBSTETRICS & GYNECOLOGY

## 2019-03-27 NOTE — PROGRESS NOTES
29 y.o. female  at 19w2d  Starting to feel flutters/FM, denies VB, LOF or cramping  Doing well, was seen in ED for benzodiazepine withdrawal, pt stopped taking benzos because she ran out and was unable to get anymore, she admits to buying off the street, went in to treatment at [facility], is taking Subsolv 1.5 pills a day, discussed with pt that she will have random drug screens and SS consult in hospital, discussed w/ pt that she needs to stay clean of all drugs beside suboxone  TW lbs   Anatomy US today, cranial edema noted, brain and spine sub optimal, refer to MFM  Genetic testing today, quad screen  Hep C quant today  Reviewed warning signs, normal FM,  labor precautions and how/when to call.  RTC x 4 wks, call or present sooner prn.

## 2019-03-30 ENCOUNTER — HOSPITAL ENCOUNTER (OUTPATIENT)
Facility: HOSPITAL | Age: 29
Discharge: HOME | End: 2019-03-30
Attending: EMERGENCY MEDICINE | Admitting: OBSTETRICS & GYNECOLOGY
Payer: MEDICAID

## 2019-03-30 VITALS
HEART RATE: 80 BPM | HEIGHT: 62 IN | BODY MASS INDEX: 24.84 KG/M2 | SYSTOLIC BLOOD PRESSURE: 128 MMHG | DIASTOLIC BLOOD PRESSURE: 78 MMHG | WEIGHT: 135 LBS | OXYGEN SATURATION: 99 % | TEMPERATURE: 98 F | RESPIRATION RATE: 18 BRPM

## 2019-03-30 DIAGNOSIS — O26.892 PELVIC PAIN AFFECTING PREGNANCY IN SECOND TRIMESTER, ANTEPARTUM: Primary | ICD-10-CM

## 2019-03-30 DIAGNOSIS — R10.2 PELVIC PAIN AFFECTING PREGNANCY IN SECOND TRIMESTER, ANTEPARTUM: Primary | ICD-10-CM

## 2019-03-30 PROBLEM — O26.899 PELVIC PAIN AFFECTING PREGNANCY: Status: ACTIVE | Noted: 2019-03-30

## 2019-03-30 PROBLEM — O26.899 PELVIC PAIN AFFECTING PREGNANCY: Status: RESOLVED | Noted: 2019-03-30 | Resolved: 2019-03-30

## 2019-03-30 LAB — POCT GLUCOSE: 74 MG/DL (ref 70–110)

## 2019-03-30 PROCEDURE — 99213 OFFICE O/P EST LOW 20 MIN: CPT | Mod: TH,,, | Performed by: ADVANCED PRACTICE MIDWIFE

## 2019-03-30 PROCEDURE — 25000003 PHARM REV CODE 250: Performed by: ADVANCED PRACTICE MIDWIFE

## 2019-03-30 PROCEDURE — 99213 PR OFFICE/OUTPT VISIT, EST, LEVL III, 20-29 MIN: ICD-10-PCS | Mod: TH,,, | Performed by: ADVANCED PRACTICE MIDWIFE

## 2019-03-30 PROCEDURE — G0378 HOSPITAL OBSERVATION PER HR: HCPCS

## 2019-03-30 PROCEDURE — 99285 EMERGENCY DEPT VISIT HI MDM: CPT | Mod: 25,27

## 2019-03-30 PROCEDURE — 96360 HYDRATION IV INFUSION INIT: CPT

## 2019-03-30 PROCEDURE — 82962 GLUCOSE BLOOD TEST: CPT

## 2019-03-30 PROCEDURE — 99211 OFF/OP EST MAY X REQ PHY/QHP: CPT | Mod: 25,TH

## 2019-03-30 RX ORDER — ONDANSETRON 8 MG/1
8 TABLET, ORALLY DISINTEGRATING ORAL EVERY 8 HOURS PRN
Status: DISCONTINUED | OUTPATIENT
Start: 2019-03-30 | End: 2019-03-30 | Stop reason: HOSPADM

## 2019-03-30 RX ORDER — ACETAMINOPHEN 325 MG/1
650 TABLET ORAL EVERY 6 HOURS PRN
Status: DISCONTINUED | OUTPATIENT
Start: 2019-03-30 | End: 2019-03-30 | Stop reason: HOSPADM

## 2019-03-30 RX ADMIN — ACETAMINOPHEN 650 MG: 325 TABLET ORAL at 07:03

## 2019-03-30 RX ADMIN — SODIUM CHLORIDE, SODIUM LACTATE, POTASSIUM CHLORIDE, AND CALCIUM CHLORIDE 500 ML: .6; .31; .03; .02 INJECTION, SOLUTION INTRAVENOUS at 06:03
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
## 2019-03-30 NOTE — ED NOTES
Pt states she is having contractions every 5 minutes. Reports pain to her lower back. Denies bleeding, or discharge. Pt reports she has had contractions x 3 days with contractions coming every 45 minutes. Today contractions started about an hour ago and are coming every 5 minutes. Describes contractions as low back pain with radiation to abd area. Reports she had same type contractions with previous pregnancy.

## 2019-03-31 NOTE — PROGRESS NOTES
Pt discharged to home to own care. Pt was ambulatory and left in a private car. Discharge instructions taught by RN. Pt verbalized understanding and did not have any questions.

## 2019-03-31 NOTE — DISCHARGE INSTRUCTIONS

## 2019-03-31 NOTE — DISCHARGE SUMMARY
[facility] -   Obstetrics  Discharge Summary      Patient Name: Jaymie Wilhelm  MRN: [id number]  Admission Date: 3/30/2019  Hospital Length of Stay: 0 days  Discharge Date and Time:  2019 7:06 PM  Attending Physician: Joie Harry MD   Discharging Provider: Rhona Maciel CNM  Primary Care Provider: Marshall Scales MD    HPI: 29 year old  presents to labor and delivery from the ER with complaint of abdominal discomfort    * No surgery found *     Hospital Course:   Observe  EFM/TOCO  SVE  IV  cc bolus        Final Active Diagnoses:    Diagnosis Date Noted POA    PRINCIPAL PROBLEM:  Pelvic pain affecting pregnancy in second trimester, antepartum [O26.892, R10.2] 2019 Yes      Problems Resolved During this Admission:    Diagnosis Date Noted Date Resolved POA    Pelvic pain affecting pregnancy [O26.899, R10.2] 2019 Yes            Feeding Method: bottle    Immunizations     None          This patient has no babies on file.  Pending Diagnostic Studies:     None          Discharged Condition: good    Disposition: Home or Self Care    Follow Up:  Follow-up Information     Please follow up.    Why:  keep scheduled appt               Patient Instructions:      Notify your health care provider if you experience any of the following:  severe uncontrolled pain     Medications:  Current Discharge Medication List      CONTINUE these medications which have NOT CHANGED    Details   buprenorphine HCl/naloxone HCl (SUBOXONE SL) Place under the tongue.      LORazepam (ATIVAN) 0.5 MG tablet Take 2 tablets (1 mg total) by mouth every 8 (eight) hours for 2 days, THEN 2 tablets (1 mg total) 2 (two) times daily for 2 days, THEN 1 tablet (0.5 mg total) 2 (two) times daily for 2 days, THEN 1 tablet (0.5 mg total) every evening for 2 days.  Qty: 26 tablet, Refills: 0      nitrofurantoin, macrocrystal-monohydrate, (MACROBID) 100 MG capsule Take 1 capsule (100 mg total) by  mouth 2 (two) times daily. for 7 days  Qty: 14 capsule, Refills: 0      pantoprazole (PROTONIX) 40 MG tablet Take 1 tablet (40 mg total) by mouth once daily.  Qty: 30 tablet, Refills: 1             Rhona Maciel CNM  Obstetrics [facility] -

## 2019-04-01 LAB
# FETUSES US: NORMAL
2ND TRIMESTER 4 SCREEN PNL SERPL: NEGATIVE
2ND TRIMESTER 4 SCREEN SERPL-IMP: NORMAL
AFP MOM SERPL: 1.12
AFP SERPL-MCNC: 64.1 NG/ML
AGE AT DELIVERY: 27
B-HCG MOM SERPL: 0.6
B-HCG SERPL-ACNC: 13.2 IU/ML
FET TS 21 RISK FROM MAT AGE: NORMAL
GA (DAYS): 2 D
GA (WEEKS): 19 WK
GA METHOD: NORMAL
IDDM PATIENT QL: NORMAL
INHIBIN A MOM SERPL: 2.28
INHIBIN A SERPL-MCNC: 451.6 PG/ML
SMOKING STATUS FTND: NO
TS 18 RISK FETUS: NORMAL
TS 21 RISK FETUS: NORMAL
U ESTRIOL MOM SERPL: 1.25
U ESTRIOL SERPL-MCNC: 2.1 NG/ML

## 2019-04-02 ENCOUNTER — TELEPHONE (OUTPATIENT)
Dept: MATERNAL FETAL MEDICINE | Facility: CLINIC | Age: 29
End: 2019-04-02

## 2019-04-02 DIAGNOSIS — O35.9XX0 SUSPECTED FETAL ABNORMALITY AFFECTING MANAGEMENT OF MOTHER, SINGLE OR UNSPECIFIED FETUS: ICD-10-CM

## 2019-04-02 DIAGNOSIS — Z36.89 ENCOUNTER FOR FETAL ANATOMIC SURVEY: Primary | ICD-10-CM

## 2019-04-02 NOTE — TELEPHONE ENCOUNTER
Nurse returning patient's phone call. Patient scheduled for Wednesday, April 10th at 2:40 pm with Dr. Quintero.    Patient verbalize understanding of information received.    ----- Message from Sarah Salinas sent at 4/2/2019  9:44 AM CDT -----  Contact: Self  Pt is calling to rs her NS appt from last week. Pt can be reached at [phone]. sh
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
## 2019-11-05 ENCOUNTER — LAB VISIT (OUTPATIENT)
Dept: LAB | Facility: HOSPITAL | Age: 29
End: 2019-11-05
Attending: INTERNAL MEDICINE
Payer: MEDICAID

## 2019-11-05 ENCOUNTER — INITIAL CONSULT (OUTPATIENT)
Dept: HEMATOLOGY/ONCOLOGY | Facility: CLINIC | Age: 29
End: 2019-11-05
Payer: MEDICAID

## 2019-11-05 VITALS
WEIGHT: 123 LBS | HEIGHT: 62 IN | DIASTOLIC BLOOD PRESSURE: 87 MMHG | SYSTOLIC BLOOD PRESSURE: 134 MMHG | OXYGEN SATURATION: 96 % | HEART RATE: 70 BPM | TEMPERATURE: 97 F | BODY MASS INDEX: 22.63 KG/M2 | RESPIRATION RATE: 12 BRPM

## 2019-11-05 DIAGNOSIS — D50.8 IRON DEFICIENCY ANEMIA SECONDARY TO INADEQUATE DIETARY IRON INTAKE: ICD-10-CM

## 2019-11-05 DIAGNOSIS — D64.9 ANEMIA, UNSPECIFIED TYPE: ICD-10-CM

## 2019-11-05 DIAGNOSIS — D64.9 ANEMIA, UNSPECIFIED TYPE: Primary | ICD-10-CM

## 2019-11-05 LAB
ALBUMIN SERPL BCP-MCNC: 4.2 G/DL (ref 3.5–5.2)
ALP SERPL-CCNC: 74 U/L (ref 55–135)
ALT SERPL W/O P-5'-P-CCNC: 7 U/L (ref 10–44)
ANION GAP SERPL CALC-SCNC: 12 MMOL/L (ref 8–16)
AST SERPL-CCNC: 13 U/L (ref 10–40)
BASOPHILS # BLD AUTO: 0.12 K/UL (ref 0–0.2)
BASOPHILS NFR BLD: 0.9 % (ref 0–1.9)
BILIRUB SERPL-MCNC: 0.4 MG/DL (ref 0.1–1)
BUN SERPL-MCNC: 13 MG/DL (ref 6–20)
CALCIUM SERPL-MCNC: 9.9 MG/DL (ref 8.7–10.5)
CHLORIDE SERPL-SCNC: 103 MMOL/L (ref 95–110)
CO2 SERPL-SCNC: 23 MMOL/L (ref 23–29)
CREAT SERPL-MCNC: 0.8 MG/DL (ref 0.5–1.4)
DIFFERENTIAL METHOD: ABNORMAL
EOSINOPHIL # BLD AUTO: 0.2 K/UL (ref 0–0.5)
EOSINOPHIL NFR BLD: 1.4 % (ref 0–8)
ERYTHROCYTE [DISTWIDTH] IN BLOOD BY AUTOMATED COUNT: 16 % (ref 11.5–14.5)
EST. GFR  (AFRICAN AMERICAN): >60 ML/MIN/1.73 M^2
EST. GFR  (NON AFRICAN AMERICAN): >60 ML/MIN/1.73 M^2
FERRITIN SERPL-MCNC: 10 NG/ML (ref 20–300)
FOLATE SERPL-MCNC: 16.6 NG/ML (ref 4–24)
GLUCOSE SERPL-MCNC: 96 MG/DL (ref 70–110)
HCT VFR BLD AUTO: 42.2 % (ref 37–48.5)
HGB BLD-MCNC: 12.9 G/DL (ref 12–16)
IMM GRANULOCYTES # BLD AUTO: 0.02 K/UL (ref 0–0.04)
IMM GRANULOCYTES NFR BLD AUTO: 0.2 % (ref 0–0.5)
IRON SERPL-MCNC: 26 UG/DL (ref 30–160)
LYMPHOCYTES # BLD AUTO: 7.3 K/UL (ref 1–4.8)
LYMPHOCYTES NFR BLD: 56.9 % (ref 18–48)
MCH RBC QN AUTO: 24.8 PG (ref 27–31)
MCHC RBC AUTO-ENTMCNC: 30.6 G/DL (ref 32–36)
MCV RBC AUTO: 81 FL (ref 82–98)
MONOCYTES # BLD AUTO: 0.8 K/UL (ref 0.3–1)
MONOCYTES NFR BLD: 6.4 % (ref 4–15)
NEUTROPHILS # BLD AUTO: 4.4 K/UL (ref 1.8–7.7)
NEUTROPHILS NFR BLD: 34.2 % (ref 38–73)
NRBC BLD-RTO: 0 /100 WBC
PLATELET # BLD AUTO: 407 K/UL (ref 150–350)
PMV BLD AUTO: 11.2 FL (ref 9.2–12.9)
POTASSIUM SERPL-SCNC: 3.3 MMOL/L (ref 3.5–5.1)
PROT SERPL-MCNC: 7.3 G/DL (ref 6–8.4)
RBC # BLD AUTO: 5.21 M/UL (ref 4–5.4)
SATURATED IRON: 5 % (ref 20–50)
SODIUM SERPL-SCNC: 138 MMOL/L (ref 136–145)
T4 FREE SERPL-MCNC: 1.26 NG/DL (ref 0.71–1.51)
TOTAL IRON BINDING CAPACITY: 474 UG/DL (ref 250–450)
TRANSFERRIN SERPL-MCNC: 320 MG/DL (ref 200–375)
TSH SERPL DL<=0.005 MIU/L-ACNC: 1.28 UIU/ML (ref 0.4–4)
VIT B12 SERPL-MCNC: 618 PG/ML (ref 210–950)
WBC # BLD AUTO: 12.77 K/UL (ref 3.9–12.7)

## 2019-11-05 PROCEDURE — 80053 COMPREHEN METABOLIC PANEL: CPT

## 2019-11-05 PROCEDURE — 84439 ASSAY OF FREE THYROXINE: CPT

## 2019-11-05 PROCEDURE — 83540 ASSAY OF IRON: CPT

## 2019-11-05 PROCEDURE — 99999 PR PBB SHADOW E&M-EST. PATIENT-LVL IV: ICD-10-PCS | Mod: PBBFAC,,, | Performed by: INTERNAL MEDICINE

## 2019-11-05 PROCEDURE — 82746 ASSAY OF FOLIC ACID SERUM: CPT

## 2019-11-05 PROCEDURE — 84443 ASSAY THYROID STIM HORMONE: CPT

## 2019-11-05 PROCEDURE — 82728 ASSAY OF FERRITIN: CPT

## 2019-11-05 PROCEDURE — 99205 PR OFFICE/OUTPT VISIT, NEW, LEVL V, 60-74 MIN: ICD-10-PCS | Mod: S$PBB,,, | Performed by: INTERNAL MEDICINE

## 2019-11-05 PROCEDURE — 99205 OFFICE O/P NEW HI 60 MIN: CPT | Mod: S$PBB,,, | Performed by: INTERNAL MEDICINE

## 2019-11-05 PROCEDURE — 36415 COLL VENOUS BLD VENIPUNCTURE: CPT

## 2019-11-05 PROCEDURE — 82607 VITAMIN B-12: CPT

## 2019-11-05 PROCEDURE — 99999 PR PBB SHADOW E&M-EST. PATIENT-LVL IV: CPT | Mod: PBBFAC,,, | Performed by: INTERNAL MEDICINE

## 2019-11-05 PROCEDURE — 99214 OFFICE O/P EST MOD 30 MIN: CPT | Mod: PBBFAC | Performed by: INTERNAL MEDICINE

## 2019-11-05 RX ORDER — AMLODIPINE BESYLATE 5 MG/1
5 TABLET ORAL DAILY
Refills: 2 | COMMUNITY
Start: 2019-10-07

## 2019-11-05 RX ORDER — CHLORHEXIDINE GLUCONATE ORAL RINSE 1.2 MG/ML
SOLUTION DENTAL
Refills: 0 | COMMUNITY
Start: 2019-09-14 | End: 2022-10-31

## 2019-11-05 RX ORDER — SODIUM FLUORIDE 5 MG/ML
PASTE, DENTIFRICE DENTAL
Refills: 0 | COMMUNITY
Start: 2019-09-14 | End: 2022-10-31

## 2019-11-05 RX ORDER — IBUPROFEN 200 MG
TABLET ORAL
Refills: 0 | COMMUNITY
Start: 2019-10-30 | End: 2022-10-31

## 2019-11-05 RX ORDER — VARENICLINE TARTRATE 0.5 (11)-1
1 KIT ORAL 2 TIMES DAILY
Refills: 0 | COMMUNITY
Start: 2019-10-03 | End: 2022-10-31

## 2019-11-05 NOTE — LETTER
November 5, 2019      Ferdinand Hamm, CHERELLE  [address]  [place] [address]            [facility]  [address] [facility] [address]  Phone: [phone]  Fax: [phone]          Patient: Jaymie Wilhelm   MR Number: [id number]   YOB: 1990   Date of Visit: 11/5/2019       Dear Ferdinand Hamm:    Thank you for referring Jaymie Wilhelm to me for evaluation. Attached you will find relevant portions of my assessment and plan of care.    If you have questions, please do not hesitate to call me. I look forward to following Jaymie Wilhelm along with you.    Sincerely,    Johnson Lindsay MD    Enclosure  CC:  No Recipients    If you would like to receive this communication electronically, please contact [email or web address] or [phone] to request more information on [employer] access.    For providers and/or their staff who would like to refer a patient to [facility], please contact us through our one-stop-shop provider referral line, [facility] , at [phone].    If you feel you have received this communication in error or would no longer like to receive these types of communications, please e-mail [email or web address]

## 2019-11-06 ENCOUNTER — TELEPHONE (OUTPATIENT)
Dept: HEMATOLOGY/ONCOLOGY | Facility: CLINIC | Age: 29
End: 2019-11-06

## 2019-11-06 RX ORDER — HEPARIN 100 UNIT/ML
500 SYRINGE INTRAVENOUS
Status: CANCELLED | OUTPATIENT
Start: 2019-11-13

## 2019-11-06 RX ORDER — SODIUM CHLORIDE 0.9 % (FLUSH) 0.9 %
10 SYRINGE (ML) INJECTION
Status: CANCELLED | OUTPATIENT
Start: 2019-11-13

## 2019-11-06 RX ORDER — SODIUM CHLORIDE 0.9 % (FLUSH) 0.9 %
10 SYRINGE (ML) INJECTION
Status: CANCELLED | OUTPATIENT
Start: 2019-11-20

## 2019-11-06 RX ORDER — HEPARIN 100 UNIT/ML
500 SYRINGE INTRAVENOUS
Status: CANCELLED | OUTPATIENT
Start: 2019-11-20

## 2019-11-06 NOTE — TELEPHONE ENCOUNTER
Called patient to inform of needing iv iron, per Dr. Lindsay.. Called to find out which days wd prefer to be scheduled, patient states she does not know at the moment, but will call us bk in regards when she can check her schedule for possible appt.

## 2019-11-06 NOTE — TELEPHONE ENCOUNTER
----- Message from Johnson Lindsay MD sent at 11/6/2019 11:01 AM CST -----  Please let patient know that her iron studies showed that she is deficient in iron.  I have placed order for IV iron therapy.

## 2019-11-06 NOTE — TELEPHONE ENCOUNTER
Called pt to inform and possible f/u in regards to iv iron needed.. Patient verbalized understanding, but stated she wd have to look at her schedule before she schedules iron appointments, and will call us back to schedule

## 2019-11-19 ENCOUNTER — TELEPHONE (OUTPATIENT)
Dept: HEMATOLOGY/ONCOLOGY | Facility: CLINIC | Age: 29
End: 2019-11-19

## 2019-11-19 NOTE — TELEPHONE ENCOUNTER
Called patient to remind her of her 3:00 appt.today, patient states she will not be able to make it today, and she will call us back once she settles to reschedule.

## 2019-12-09 PROBLEM — O09.291 HISTORY OF POSTPARTUM HEMORRHAGE, CURRENTLY PREGNANT IN FIRST TRIMESTER: Status: RESOLVED | Noted: 2018-12-31 | Resolved: 2019-12-09

## 2019-12-09 PROBLEM — O09.299 HISTORY OF GESTATIONAL DIABETES IN PRIOR PREGNANCY, CURRENTLY PREGNANT: Status: RESOLVED | Noted: 2018-12-31 | Resolved: 2019-12-09

## 2019-12-09 PROBLEM — O09.299 HISTORY OF OLIGOHYDRAMNIOS IN PRIOR PREGNANCY, CURRENTLY PREGNANT: Status: RESOLVED | Noted: 2018-12-31 | Resolved: 2019-12-09

## 2019-12-09 PROBLEM — Z86.32 HISTORY OF GESTATIONAL DIABETES IN PRIOR PREGNANCY, CURRENTLY PREGNANT: Status: RESOLVED | Noted: 2018-12-31 | Resolved: 2019-12-09

## 2020-03-02 PROBLEM — Z86.32 HISTORY OF GESTATIONAL DIABETES IN PRIOR PREGNANCY, CURRENTLY PREGNANT IN SECOND TRIMESTER: Status: RESOLVED | Noted: 2019-03-27 | Resolved: 2020-03-02

## 2020-03-02 PROBLEM — O09.292 HISTORY OF GESTATIONAL DIABETES IN PRIOR PREGNANCY, CURRENTLY PREGNANT IN SECOND TRIMESTER: Status: RESOLVED | Noted: 2019-03-27 | Resolved: 2020-03-02

## 2020-07-20 ENCOUNTER — HOSPITAL ENCOUNTER (EMERGENCY)
Facility: HOSPITAL | Age: 30
Discharge: HOME | End: 2020-07-20
Attending: EMERGENCY MEDICINE
Payer: MEDICAID

## 2020-07-20 VITALS
DIASTOLIC BLOOD PRESSURE: 67 MMHG | RESPIRATION RATE: 18 BRPM | HEIGHT: 62 IN | SYSTOLIC BLOOD PRESSURE: 141 MMHG | WEIGHT: 130.5 LBS | TEMPERATURE: 98 F | BODY MASS INDEX: 24.01 KG/M2 | HEART RATE: 80 BPM | OXYGEN SATURATION: 98 %

## 2020-07-20 DIAGNOSIS — I10 HYPERTENSION, UNSPECIFIED TYPE: ICD-10-CM

## 2020-07-20 DIAGNOSIS — R51.9 NONINTRACTABLE HEADACHE, UNSPECIFIED CHRONICITY PATTERN, UNSPECIFIED HEADACHE TYPE: Primary | ICD-10-CM

## 2020-07-20 PROCEDURE — 99283 EMERGENCY DEPT VISIT LOW MDM: CPT

## 2020-07-20 RX ORDER — METOCLOPRAMIDE 10 MG/1
10 TABLET ORAL EVERY 6 HOURS
Qty: 30 TABLET | Refills: 0 | Status: SHIPPED | OUTPATIENT
Start: 2020-07-20 | End: 2022-10-31
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
## 2021-04-28 ENCOUNTER — PATIENT MESSAGE (OUTPATIENT)
Dept: RESEARCH | Facility: HOSPITAL | Age: 31
End: 2021-04-28

## 2022-09-13 ENCOUNTER — TELEPHONE (OUTPATIENT)
Dept: HEMATOLOGY/ONCOLOGY | Facility: CLINIC | Age: 32
End: 2022-09-13
Payer: MEDICAID

## 2022-09-13 NOTE — TELEPHONE ENCOUNTER
Tried contacting pt, but no answer still. States that not accepting calls at this time. Called to speak with pt on behalf of appt information and confirmation.

## 2022-09-13 NOTE — TELEPHONE ENCOUNTER
----- Message from Lonny Zuniga sent at 9/12/2022  2:58 PM CDT -----  Contact: Pt  Pt was seen by  in 11/2019 for anemia and is wanting to schedule to see the Dr     Type:  Sooner Apoointment Request    Caller is requesting a sooner appointment.  Caller declined first available appointment listed below.  Caller will not accept being placed on the waitlist and is requesting a message be sent to doctor.  Name of Caller: pt   When is the first available appointment?  Symptoms: anemia   Would the patient rather a call back or a response via [facility]?  phone  Best Call Back Number: [phone]  Additional Information:  last seen by  11/2019

## 2022-10-10 ENCOUNTER — TELEPHONE (OUTPATIENT)
Dept: HEMATOLOGY/ONCOLOGY | Facility: CLINIC | Age: 32
End: 2022-10-10
Payer: MEDICAID

## 2022-10-10 ENCOUNTER — LAB VISIT (OUTPATIENT)
Dept: LAB | Facility: HOSPITAL | Age: 32
End: 2022-10-10
Attending: NURSE PRACTITIONER
Payer: MEDICAID

## 2022-10-10 DIAGNOSIS — D50.8 IRON DEFICIENCY ANEMIA SECONDARY TO INADEQUATE DIETARY IRON INTAKE: Primary | ICD-10-CM

## 2022-10-10 DIAGNOSIS — D50.8 IRON DEFICIENCY ANEMIA SECONDARY TO INADEQUATE DIETARY IRON INTAKE: ICD-10-CM

## 2022-10-10 LAB
ALBUMIN SERPL BCP-MCNC: 4 G/DL (ref 3.5–5.2)
ALP SERPL-CCNC: 83 U/L (ref 55–135)
ALT SERPL W/O P-5'-P-CCNC: 30 U/L (ref 10–44)
ANION GAP SERPL CALC-SCNC: 10 MMOL/L (ref 8–16)
AST SERPL-CCNC: 30 U/L (ref 10–40)
BASOPHILS # BLD AUTO: 0.1 K/UL (ref 0–0.2)
BASOPHILS NFR BLD: 1.3 % (ref 0–1.9)
BILIRUB SERPL-MCNC: 0.2 MG/DL (ref 0.1–1)
BUN SERPL-MCNC: 15 MG/DL (ref 6–20)
CALCIUM SERPL-MCNC: 9.6 MG/DL (ref 8.7–10.5)
CHLORIDE SERPL-SCNC: 104 MMOL/L (ref 95–110)
CO2 SERPL-SCNC: 23 MMOL/L (ref 23–29)
CREAT SERPL-MCNC: 0.7 MG/DL (ref 0.5–1.4)
DIFFERENTIAL METHOD: ABNORMAL
EOSINOPHIL # BLD AUTO: 0.1 K/UL (ref 0–0.5)
EOSINOPHIL NFR BLD: 1.8 % (ref 0–8)
ERYTHROCYTE [DISTWIDTH] IN BLOOD BY AUTOMATED COUNT: 16.1 % (ref 11.5–14.5)
EST. GFR  (NO RACE VARIABLE): >60 ML/MIN/1.73 M^2
FERRITIN SERPL-MCNC: 26 NG/ML (ref 20–300)
GLUCOSE SERPL-MCNC: 99 MG/DL (ref 70–110)
HCT VFR BLD AUTO: 41.3 % (ref 37–48.5)
HGB BLD-MCNC: 13.1 G/DL (ref 12–16)
IMM GRANULOCYTES # BLD AUTO: 0.02 K/UL (ref 0–0.04)
IMM GRANULOCYTES NFR BLD AUTO: 0.3 % (ref 0–0.5)
IRON SERPL-MCNC: 23 UG/DL (ref 30–160)
LYMPHOCYTES # BLD AUTO: 2.7 K/UL (ref 1–4.8)
LYMPHOCYTES NFR BLD: 35.3 % (ref 18–48)
MCH RBC QN AUTO: 26.9 PG (ref 27–31)
MCHC RBC AUTO-ENTMCNC: 31.7 G/DL (ref 32–36)
MCV RBC AUTO: 85 FL (ref 82–98)
MONOCYTES # BLD AUTO: 0.5 K/UL (ref 0.3–1)
MONOCYTES NFR BLD: 6.3 % (ref 4–15)
NEUTROPHILS # BLD AUTO: 4.2 K/UL (ref 1.8–7.7)
NEUTROPHILS NFR BLD: 55 % (ref 38–73)
NRBC BLD-RTO: 0 /100 WBC
PLATELET # BLD AUTO: 318 K/UL (ref 150–450)
PMV BLD AUTO: 12.7 FL (ref 9.2–12.9)
POTASSIUM SERPL-SCNC: 4.9 MMOL/L (ref 3.5–5.1)
PROT SERPL-MCNC: 6.9 G/DL (ref 6–8.4)
RBC # BLD AUTO: 4.87 M/UL (ref 4–5.4)
SATURATED IRON: 5 % (ref 20–50)
SODIUM SERPL-SCNC: 137 MMOL/L (ref 136–145)
TOTAL IRON BINDING CAPACITY: 460 UG/DL (ref 250–450)
TRANSFERRIN SERPL-MCNC: 311 MG/DL (ref 200–375)
WBC # BLD AUTO: 7.65 K/UL (ref 3.9–12.7)

## 2022-10-10 PROCEDURE — 80053 COMPREHEN METABOLIC PANEL: CPT | Performed by: NURSE PRACTITIONER

## 2022-10-10 PROCEDURE — 84466 ASSAY OF TRANSFERRIN: CPT | Performed by: NURSE PRACTITIONER

## 2022-10-10 PROCEDURE — 82728 ASSAY OF FERRITIN: CPT | Performed by: NURSE PRACTITIONER

## 2022-10-10 PROCEDURE — 36415 COLL VENOUS BLD VENIPUNCTURE: CPT | Mod: PN | Performed by: NURSE PRACTITIONER

## 2022-10-10 PROCEDURE — 85025 COMPLETE CBC W/AUTO DIFF WBC: CPT | Performed by: NURSE PRACTITIONER

## 2022-10-12 ENCOUNTER — PATIENT MESSAGE (OUTPATIENT)
Dept: HEMATOLOGY/ONCOLOGY | Facility: CLINIC | Age: 32
End: 2022-10-12
Payer: MEDICAID

## 2022-10-12 ENCOUNTER — TELEPHONE (OUTPATIENT)
Dept: HEMATOLOGY/ONCOLOGY | Facility: CLINIC | Age: 32
End: 2022-10-12
Payer: MEDICAID

## 2022-10-12 NOTE — PROGRESS NOTES
It doesn't look like she has a f/u to discuss.  See if she would like to do a virtual visit. She is iron deficient

## 2022-10-12 NOTE — TELEPHONE ENCOUNTER
----- Message from Karin Heath NP sent at 10/12/2022  9:32 AM CDT -----  It doesn't look like she has a f/u to discuss.  See if she would like to do a virtual visit. She is iron deficient

## 2022-10-24 ENCOUNTER — TELEPHONE (OUTPATIENT)
Dept: HEMATOLOGY/ONCOLOGY | Facility: CLINIC | Age: 32
End: 2022-10-24
Payer: MEDICAID

## 2022-10-24 NOTE — TELEPHONE ENCOUNTER
----- Message from Vibha Alaniz sent at 10/24/2022 10:09 AM CDT -----  Contact: [phone]  Patient is calling to reschedule missed appointment. Please call her back at [phone]. Thanks/SUSIE

## 2022-10-24 NOTE — TELEPHONE ENCOUNTER
Nurse spoke with pt in regards to rescheduling her missed appt, pt has been rescheduled, verbalized understanding.

## 2022-10-24 NOTE — TELEPHONE ENCOUNTER
----- Message from Kasie Mcknight sent at 10/24/2022  2:11 PM CDT -----  Contact: [phone] Patient  Patient is returning a phone call.  Who left a message for the patient: Sanya Lawson MA  Does patient know what this is regarding:  missed appt r/s  Would you like a call back, or a response through your [facility] portal?:   call back  Comments:

## 2022-10-31 ENCOUNTER — TELEPHONE (OUTPATIENT)
Dept: INFUSION THERAPY | Facility: HOSPITAL | Age: 32
End: 2022-10-31
Payer: MEDICAID

## 2022-10-31 ENCOUNTER — OFFICE VISIT (OUTPATIENT)
Dept: HEMATOLOGY/ONCOLOGY | Facility: CLINIC | Age: 32
End: 2022-10-31
Payer: MEDICAID

## 2022-10-31 DIAGNOSIS — D50.9 IRON DEFICIENCY ANEMIA, UNSPECIFIED IRON DEFICIENCY ANEMIA TYPE: ICD-10-CM

## 2022-10-31 DIAGNOSIS — E61.1 IRON DEFICIENCY: ICD-10-CM

## 2022-10-31 DIAGNOSIS — R11.0 NAUSEA: ICD-10-CM

## 2022-10-31 DIAGNOSIS — K21.9 GASTROESOPHAGEAL REFLUX DISEASE, UNSPECIFIED WHETHER ESOPHAGITIS PRESENT: Primary | ICD-10-CM

## 2022-10-31 PROCEDURE — 1159F PR MEDICATION LIST DOCUMENTED IN MEDICAL RECORD: ICD-10-PCS | Mod: CPTII,95,, | Performed by: NURSE PRACTITIONER

## 2022-10-31 PROCEDURE — 1160F PR REVIEW ALL MEDS BY PRESCRIBER/CLIN PHARMACIST DOCUMENTED: ICD-10-PCS | Mod: CPTII,95,, | Performed by: NURSE PRACTITIONER

## 2022-10-31 PROCEDURE — 1159F MED LIST DOCD IN RCRD: CPT | Mod: CPTII,95,, | Performed by: NURSE PRACTITIONER

## 2022-10-31 PROCEDURE — 99213 OFFICE O/P EST LOW 20 MIN: CPT | Mod: 95,,, | Performed by: NURSE PRACTITIONER

## 2022-10-31 PROCEDURE — 1160F RVW MEDS BY RX/DR IN RCRD: CPT | Mod: CPTII,95,, | Performed by: NURSE PRACTITIONER

## 2022-10-31 PROCEDURE — 99213 PR OFFICE/OUTPT VISIT, EST, LEVL III, 20-29 MIN: ICD-10-PCS | Mod: 95,,, | Performed by: NURSE PRACTITIONER

## 2022-10-31 RX ORDER — SODIUM CHLORIDE 0.9 % (FLUSH) 0.9 %
10 SYRINGE (ML) INJECTION
OUTPATIENT
Start: 2022-11-22

## 2022-10-31 RX ORDER — METHYLPREDNISOLONE SOD SUCC 125 MG
40 VIAL (EA) INJECTION
Status: CANCELLED
Start: 2022-11-15

## 2022-10-31 RX ORDER — HEPARIN 100 UNIT/ML
500 SYRINGE INTRAVENOUS
Status: CANCELLED | OUTPATIENT
Start: 2022-11-12

## 2022-10-31 RX ORDER — SODIUM CHLORIDE 0.9 % (FLUSH) 0.9 %
10 SYRINGE (ML) INJECTION
Status: CANCELLED | OUTPATIENT
Start: 2022-11-12

## 2022-10-31 RX ORDER — METHYLPREDNISOLONE SOD SUCC 125 MG
40 VIAL (EA) INJECTION
Start: 2022-11-22

## 2022-10-31 RX ORDER — METHYLPREDNISOLONE SOD SUCC 125 MG
40 VIAL (EA) INJECTION
Status: CANCELLED
Start: 2022-11-12

## 2022-10-31 RX ORDER — HEPARIN 100 UNIT/ML
500 SYRINGE INTRAVENOUS
OUTPATIENT
Start: 2022-11-22

## 2022-10-31 RX ORDER — HEPARIN 100 UNIT/ML
500 SYRINGE INTRAVENOUS
Status: CANCELLED | OUTPATIENT
Start: 2022-11-15

## 2022-10-31 RX ORDER — HEPARIN 100 UNIT/ML
500 SYRINGE INTRAVENOUS
Status: CANCELLED | OUTPATIENT
Start: 2022-11-08

## 2022-10-31 RX ORDER — SODIUM CHLORIDE 0.9 % (FLUSH) 0.9 %
10 SYRINGE (ML) INJECTION
Status: CANCELLED | OUTPATIENT
Start: 2022-10-31

## 2022-10-31 RX ORDER — METHYLPREDNISOLONE SOD SUCC 125 MG
40 VIAL (EA) INJECTION
Status: CANCELLED
Start: 2022-11-08

## 2022-10-31 RX ORDER — HEPARIN 100 UNIT/ML
500 SYRINGE INTRAVENOUS
Status: CANCELLED | OUTPATIENT
Start: 2022-10-31

## 2022-10-31 RX ORDER — SODIUM CHLORIDE 0.9 % (FLUSH) 0.9 %
10 SYRINGE (ML) INJECTION
Status: CANCELLED | OUTPATIENT
Start: 2022-11-15

## 2022-10-31 RX ORDER — METHYLPREDNISOLONE SOD SUCC 125 MG
40 VIAL (EA) INJECTION
Status: CANCELLED
Start: 2022-10-31

## 2022-10-31 RX ORDER — SODIUM CHLORIDE 0.9 % (FLUSH) 0.9 %
10 SYRINGE (ML) INJECTION
Status: CANCELLED | OUTPATIENT
Start: 2022-11-08

## 2022-10-31 NOTE — TELEPHONE ENCOUNTER
MA called pt to schedule iron infusion. Pt did not answer, MA left voicemail with purpose of the phone call and a number to call back.
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
## 2022-11-01 ENCOUNTER — TELEPHONE (OUTPATIENT)
Dept: HEMATOLOGY/ONCOLOGY | Facility: CLINIC | Age: 32
End: 2022-11-01
Payer: MEDICAID

## 2022-11-01 NOTE — TELEPHONE ENCOUNTER
----- Message from Peggy Riley sent at 11/1/2022  9:21 AM CDT -----  Type:  Patient Returning Call    Who Called:patient  Who Left Message for Patient:nurse  Does the patient know what this is regarding?:na  Would the patient rather a call back or a response via [employer]? Call back  Best Call Back Number:[phone]  Additional Information: na         Pt requesting to speak with Kathryn Bustamante in infusion to coordinate scheduling. Informed pt I would send her a message regarding call back request.Pt verbalized her understanding

## 2022-11-02 ENCOUNTER — TELEPHONE (OUTPATIENT)
Dept: HEMATOLOGY/ONCOLOGY | Facility: CLINIC | Age: 32
End: 2022-11-02
Payer: MEDICAID

## 2022-11-02 ENCOUNTER — PATIENT MESSAGE (OUTPATIENT)
Dept: HEMATOLOGY/ONCOLOGY | Facility: CLINIC | Age: 32
End: 2022-11-02
Payer: MEDICAID

## 2022-11-02 NOTE — TELEPHONE ENCOUNTER
Nurse spoke with pt in regards to returning a call to [facility] in infusion in regards to scheduling iron infusion. Pt was transferred to the [facility]

## 2022-11-02 NOTE — TELEPHONE ENCOUNTER
Nurse spoke with pt in regards to returning a call to [facility] in infusion in regards to scheduling iron infusion. Pt was transferred to the [facility]

## 2022-11-02 NOTE — TELEPHONE ENCOUNTER
----- Message from Vibha Alaniz sent at 11/2/2022  2:35 PM CDT -----  Contact: self/[phone]  Patient is calling to consult with nurse regarding making an appt for infusion, please call her back at [phone]. tHANKS/AR

## 2022-11-04 ENCOUNTER — TELEPHONE (OUTPATIENT)
Dept: PREADMISSION TESTING | Facility: HOSPITAL | Age: 32
End: 2022-11-04
Payer: MEDICAID

## 2022-11-04 NOTE — TELEPHONE ENCOUNTER
----- Message from Melody Mendez sent at 11/3/2022  8:56 AM CDT -----  Contact: Self/[phone]  Pt is calling in regards to her EDG appointment, please give her a call back at [phone]. Thank you s/g

## 2022-11-07 ENCOUNTER — INFUSION (OUTPATIENT)
Dept: INFUSION THERAPY | Facility: HOSPITAL | Age: 32
End: 2022-11-07
Attending: NURSE PRACTITIONER
Payer: MEDICAID

## 2022-11-07 ENCOUNTER — HOSPITAL ENCOUNTER (OUTPATIENT)
Dept: PREADMISSION TESTING | Facility: HOSPITAL | Age: 32
Discharge: HOME | End: 2022-11-07
Payer: MEDICAID

## 2022-11-07 VITALS
SYSTOLIC BLOOD PRESSURE: 127 MMHG | BODY MASS INDEX: 21.46 KG/M2 | WEIGHT: 116.63 LBS | DIASTOLIC BLOOD PRESSURE: 80 MMHG | OXYGEN SATURATION: 97 % | HEIGHT: 62 IN | RESPIRATION RATE: 18 BRPM | HEART RATE: 84 BPM | TEMPERATURE: 98 F

## 2022-11-07 DIAGNOSIS — R11.0 NAUSEA: Primary | ICD-10-CM

## 2022-11-07 DIAGNOSIS — R11.0 NAUSEA: ICD-10-CM

## 2022-11-07 DIAGNOSIS — D50.8 IRON DEFICIENCY ANEMIA SECONDARY TO INADEQUATE DIETARY IRON INTAKE: Primary | ICD-10-CM

## 2022-11-07 PROCEDURE — 96375 TX/PRO/DX INJ NEW DRUG ADDON: CPT

## 2022-11-07 PROCEDURE — 63600175 PHARM REV CODE 636 W HCPCS: Performed by: NURSE PRACTITIONER

## 2022-11-07 PROCEDURE — 96374 THER/PROPH/DIAG INJ IV PUSH: CPT

## 2022-11-07 RX ORDER — ONDANSETRON 2 MG/ML
4 INJECTION INTRAMUSCULAR; INTRAVENOUS
Status: CANCELLED
Start: 2022-11-15

## 2022-11-07 RX ORDER — ONDANSETRON 2 MG/ML
4 INJECTION INTRAMUSCULAR; INTRAVENOUS
Status: COMPLETED | OUTPATIENT
Start: 2022-11-07 | End: 2022-11-07

## 2022-11-07 RX ORDER — ONDANSETRON 2 MG/ML
4 INJECTION INTRAMUSCULAR; INTRAVENOUS
Status: CANCELLED
Start: 2022-11-12

## 2022-11-07 RX ORDER — ONDANSETRON 2 MG/ML
4 INJECTION INTRAMUSCULAR; INTRAVENOUS
Start: 2022-11-22

## 2022-11-07 RX ORDER — ONDANSETRON 2 MG/ML
4 INJECTION INTRAMUSCULAR; INTRAVENOUS
Status: CANCELLED
Start: 2022-11-08

## 2022-11-07 RX ORDER — METHYLPREDNISOLONE SOD SUCC 125 MG
40 VIAL (EA) INJECTION
Status: COMPLETED | OUTPATIENT
Start: 2022-11-07 | End: 2022-11-07

## 2022-11-07 RX ORDER — ONDANSETRON 2 MG/ML
4 INJECTION INTRAMUSCULAR; INTRAVENOUS
Status: CANCELLED
Start: 2022-11-07

## 2022-11-07 RX ADMIN — METHYLPREDNISOLONE SODIUM SUCCINATE 40 MG: 125 INJECTION, POWDER, FOR SOLUTION INTRAMUSCULAR; INTRAVENOUS at 10:11

## 2022-11-07 RX ADMIN — ONDANSETRON 4 MG: 2 INJECTION INTRAMUSCULAR; INTRAVENOUS at 10:11

## 2022-11-07 RX ADMIN — IRON SUCROSE 200 MG: 20 INJECTION, SOLUTION INTRAVENOUS at 11:11

## 2022-11-07 NOTE — DISCHARGE INSTRUCTIONS
Thank you for allowing me to care for you today,  AMANDA GamezN, RN    [facility]  [address] [address]  [phone] phone     [phone] fax  Hours of Operation: Monday- Friday 8:00am- 5:00pm  After hours phone  [phone]  Hematology / Oncology Physicians on call      Dr. Quinten Banuelos, SHANNA Burnham, ANNE Moran NP Phaon Dunbar, ANNE Warren, ANNE    Please call with any concerns regarding your appointment today.

## 2022-11-07 NOTE — PLAN OF CARE
Pt is stable. Pt administered Venofer today. Pt voiced she was doing well. Pt will follow up in two days.   Problem: Fall Injury Risk  Goal: Absence of Fall and Fall-Related Injury  Outcome: Ongoing, Progressing     Problem: Anemia  Goal: Anemia Symptom Improvement  Outcome: Ongoing, Progressing     Problem: Adult Inpatient Plan of Care  Goal: Plan of Care Review  Outcome: Ongoing, Progressing  Goal: Patient-Specific Goal (Individualized)  Outcome: Ongoing, Progressing  Flowsheets (Taken 11/7/2022 1252)  Anxieties, Fears or Concerns: Pt denies.  Individualized Care Needs: Pt provided pillow, warm blanket and legs elevated in reclined position.  Patient-Specific Goals (Include Timeframe): Tolerate treatment as scheduled.

## 2022-11-08 ENCOUNTER — ANESTHESIA EVENT (OUTPATIENT)
Dept: ENDOSCOPY | Facility: HOSPITAL | Age: 32
End: 2022-11-08
Payer: MEDICAID
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
## 2022-11-09 ENCOUNTER — HOSPITAL ENCOUNTER (OUTPATIENT)
Facility: HOSPITAL | Age: 32
Discharge: HOME | End: 2022-11-09
Attending: INTERNAL MEDICINE | Admitting: INTERNAL MEDICINE
Payer: MEDICAID

## 2022-11-09 ENCOUNTER — ANESTHESIA (OUTPATIENT)
Dept: ENDOSCOPY | Facility: HOSPITAL | Age: 32
End: 2022-11-09
Payer: MEDICAID

## 2022-11-09 VITALS
TEMPERATURE: 97 F | BODY MASS INDEX: 21.24 KG/M2 | HEIGHT: 62 IN | WEIGHT: 115.44 LBS | HEART RATE: 57 BPM | RESPIRATION RATE: 12 BRPM | OXYGEN SATURATION: 100 % | SYSTOLIC BLOOD PRESSURE: 131 MMHG | DIASTOLIC BLOOD PRESSURE: 72 MMHG

## 2022-11-09 DIAGNOSIS — D50.8 IRON DEFICIENCY ANEMIA SECONDARY TO INADEQUATE DIETARY IRON INTAKE: Primary | ICD-10-CM

## 2022-11-09 DIAGNOSIS — D64.9 ANEMIA: ICD-10-CM

## 2022-11-09 LAB
B-HCG UR QL: NEGATIVE
CTP QC/QA: YES

## 2022-11-09 PROCEDURE — 27201012 HC FORCEPS, HOT/COLD, DISP: Performed by: INTERNAL MEDICINE

## 2022-11-09 PROCEDURE — D9220A PRA ANESTHESIA: Mod: CRNA,,, | Performed by: NURSE ANESTHETIST, CERTIFIED REGISTERED

## 2022-11-09 PROCEDURE — 43239 EGD BIOPSY SINGLE/MULTIPLE: CPT | Mod: ,,, | Performed by: INTERNAL MEDICINE

## 2022-11-09 PROCEDURE — D9220A PRA ANESTHESIA: ICD-10-PCS | Mod: ANES,,, | Performed by: ANESTHESIOLOGY

## 2022-11-09 PROCEDURE — 37000008 HC ANESTHESIA 1ST 15 MINUTES: Performed by: INTERNAL MEDICINE

## 2022-11-09 PROCEDURE — 88305 TISSUE EXAM BY PATHOLOGIST: CPT | Mod: 59 | Performed by: PATHOLOGY

## 2022-11-09 PROCEDURE — 43239 EGD BIOPSY SINGLE/MULTIPLE: CPT | Performed by: INTERNAL MEDICINE

## 2022-11-09 PROCEDURE — D9220A PRA ANESTHESIA: ICD-10-PCS | Mod: CRNA,,, | Performed by: NURSE ANESTHETIST, CERTIFIED REGISTERED

## 2022-11-09 PROCEDURE — 88305 TISSUE EXAM BY PATHOLOGIST: ICD-10-PCS | Mod: 26,,, | Performed by: PATHOLOGY

## 2022-11-09 PROCEDURE — 88342 IMHCHEM/IMCYTCHM 1ST ANTB: CPT | Performed by: PATHOLOGY

## 2022-11-09 PROCEDURE — 88305 TISSUE EXAM BY PATHOLOGIST: CPT | Mod: 26,,, | Performed by: PATHOLOGY

## 2022-11-09 PROCEDURE — 63600175 PHARM REV CODE 636 W HCPCS: Performed by: NURSE ANESTHETIST, CERTIFIED REGISTERED

## 2022-11-09 PROCEDURE — 43239 PR EGD, FLEX, W/BIOPSY, SGL/MULTI: ICD-10-PCS | Mod: ,,, | Performed by: INTERNAL MEDICINE

## 2022-11-09 PROCEDURE — 81025 URINE PREGNANCY TEST: CPT | Performed by: INTERNAL MEDICINE

## 2022-11-09 PROCEDURE — 37000009 HC ANESTHESIA EA ADD 15 MINS: Performed by: INTERNAL MEDICINE

## 2022-11-09 PROCEDURE — 88342 IMHCHEM/IMCYTCHM 1ST ANTB: CPT | Mod: 26,,, | Performed by: PATHOLOGY

## 2022-11-09 PROCEDURE — D9220A PRA ANESTHESIA: Mod: ANES,,, | Performed by: ANESTHESIOLOGY

## 2022-11-09 PROCEDURE — 88342 CHG IMMUNOCYTOCHEMISTRY: ICD-10-PCS | Mod: 26,,, | Performed by: PATHOLOGY

## 2022-11-09 PROCEDURE — 63600175 PHARM REV CODE 636 W HCPCS: Performed by: INTERNAL MEDICINE

## 2022-11-09 RX ORDER — SODIUM CHLORIDE, SODIUM LACTATE, POTASSIUM CHLORIDE, CALCIUM CHLORIDE 600; 310; 30; 20 MG/100ML; MG/100ML; MG/100ML; MG/100ML
INJECTION, SOLUTION INTRAVENOUS CONTINUOUS
Status: DISCONTINUED | OUTPATIENT
Start: 2022-11-09 | End: 2022-11-09 | Stop reason: HOSPADM

## 2022-11-09 RX ORDER — LIDOCAINE HCL/PF 100 MG/5ML
SYRINGE (ML) INTRAVENOUS
Status: DISCONTINUED | OUTPATIENT
Start: 2022-11-09 | End: 2022-11-09

## 2022-11-09 RX ORDER — SODIUM CHLORIDE, SODIUM LACTATE, POTASSIUM CHLORIDE, CALCIUM CHLORIDE 600; 310; 30; 20 MG/100ML; MG/100ML; MG/100ML; MG/100ML
INJECTION, SOLUTION INTRAVENOUS CONTINUOUS
Status: ACTIVE | OUTPATIENT
Start: 2022-11-09

## 2022-11-09 RX ORDER — PROPOFOL 10 MG/ML
VIAL (ML) INTRAVENOUS
Status: DISCONTINUED | OUTPATIENT
Start: 2022-11-09 | End: 2022-11-09

## 2022-11-09 RX ORDER — SODIUM CHLORIDE, SODIUM LACTATE, POTASSIUM CHLORIDE, CALCIUM CHLORIDE 600; 310; 30; 20 MG/100ML; MG/100ML; MG/100ML; MG/100ML
INJECTION, SOLUTION INTRAVENOUS CONTINUOUS PRN
Status: DISCONTINUED | OUTPATIENT
Start: 2022-11-09 | End: 2022-11-09

## 2022-11-09 RX ORDER — BUPRENORPHINE HYDROCHLORIDE AND NALOXONE HYDROCHLORIDE DIHYDRATE 2; .5 MG/1; MG/1
TABLET SUBLINGUAL EVERY 6 HOURS PRN
Status: ON HOLD | COMMUNITY
End: 2023-04-15 | Stop reason: HOSPADM

## 2022-11-09 RX ADMIN — SODIUM CHLORIDE, SODIUM LACTATE, POTASSIUM CHLORIDE, AND CALCIUM CHLORIDE: .6; .31; .03; .02 INJECTION, SOLUTION INTRAVENOUS at 10:11

## 2022-11-09 RX ADMIN — PROPOFOL 50 MG: 10 INJECTION, EMULSION INTRAVENOUS at 10:11

## 2022-11-09 RX ADMIN — Medication 100 MG: at 10:11

## 2022-11-09 NOTE — PROVATION PATIENT INSTRUCTIONS
Discharge Summary/Instructions after an Endoscopic Procedure  Patient Name: Jaymie Wilhelm  Patient MRN: [id number]  Patient YOB: 1990 Wednesday, November 9, 2022  Carmen Peña MD  Dear patient,  As a result of recent federal legislation (The Federal Cures Act), you may   receive lab or pathology results from your procedure in your [facility]   account before your physician is able to contact you. Your physician or   their representative will relay the results to you with their   recommendations at their soonest availability.  Thank you,  RESTRICTIONS:  During your procedure today, you received medications for sedation.  These   medications may affect your judgment, balance and coordination.  Therefore,   for 24 hours, you have the following restrictions:   - DO NOT drive a car, operate machinery, make legal/financial decisions,   sign important papers or drink alcohol.    ACTIVITY:  Today: no heavy lifting, straining or running due to procedural   sedation/anesthesia.  The following day: return to full activity including work.  DIET:  Eat and drink normally unless instructed otherwise.     TREATMENT FOR COMMON SIDE EFFECTS:  - Mild abdominal pain, nausea, belching, bloating or excessive gas:  rest,   eat lightly and use a heating pad.  - Sore Throat: treat with throat lozenges and/or gargle with warm salt   water.  - Because air was used during the procedure, expelling large amounts of air   from your rectum or belching is normal.  - If a bowel prep was taken, you may not have a bowel movement for 1-3 days.    This is normal.  SYMPTOMS TO WATCH FOR AND REPORT TO YOUR PHYSICIAN:  1. Abdominal pain or bloating, other than gas cramps.  2. Chest pain.  3. Back pain.  4. Signs of infection such as: chills or fever occurring within 24 hours   after the procedure.  5. Rectal bleeding, which would show as bright red, maroon, or black stools.   (A tablespoon of blood from the rectum is not serious,  especially if   hemorrhoids are present.)  6. Vomiting.  7. Weakness or dizziness.  GO DIRECTLY TO THE NEAREST EMERGENCY ROOM IF YOU HAVE ANY OF THE FOLLOWING:      Difficulty breathing              Chills and/or fever over 101 F   Persistent vomiting and/or vomiting blood   Severe abdominal pain   Severe chest pain   Black, tarry stools   Bleeding- more than one tablespoon   Any other symptom or condition that you feel may need urgent attention  Your doctor recommends these additional instructions:  If any biopsies were taken, your doctors clinic will contact you in 1 to 2   weeks with any results.  - Discharge patient to home.   - Resume previous diet.   - Continue present medications.   - Await pathology results.   - Return to referring physician.  For questions, problems or results please call your physician Carmen Peña MD at Work:  [phone]  If you have any questions about the above instructions, call the GI   department at [phone] or call the endoscopy unit at [phone]   from 7am until 3 pm.  [facility] [place], EMERGENCY ROOM PHONE NUMBER:   [phone]  IF A COMPLICATION OR EMERGENCY SITUATION ARISES AND YOU ARE UNABLE TO REACH   YOUR PHYSICIAN - GO DIRECTLY TO THE EMERGENCY ROOM.  I have read or have had read to me these discharge instructions for my   procedure and have received a written copy.  I understand these   instructions and will follow-up with my physician if I have any questions.     __________________________________       _____________________________________  Nurse Signature                                          Patient/Designated   Responsible Party Signature  Carmen Peña MD  11/9/2022 10:59:26 AM  This report has been verified and signed electronically.  Dear patient,  As a result of recent federal legislation (The Federal Cures Act), you may   receive lab or pathology results from your procedure in your [facility]   account before  your physician is able to contact you. Your physician or   their representative will relay the results to you with their   recommendations at their soonest availability.  Thank you,  PROVATION

## 2022-11-09 NOTE — PLAN OF CARE
Resources given to patient for personal, child, and family counseling. Still awaiting response from social work and will forward patient's information. Patient is OK for discharge.

## 2022-11-09 NOTE — TRANSFER OF CARE
"Anesthesia Transfer of Care Note    Patient: Jaymie Wilhelm    Procedure(s) Performed: Procedure(s) (LRB):  EGD (ESOPHAGOGASTRODUODENOSCOPY) (N/A)    Patient location: PACU    Anesthesia Type: general    Transport from OR: Transported from OR on room air with adequate spontaneous ventilation    Post pain: adequate analgesia    Post assessment: no apparent anesthetic complications    Post vital signs: stable    Level of consciousness: sedated    Nausea/Vomiting: no nausea/vomiting    Complications: none    Transfer of care protocol was followed      Last vitals:   Visit Vitals  BP (!) 101/56 (BP Location: Left arm, Patient Position: Lying)   Pulse 65   Temp 36.2 °C (97.1 °F) (Temporal)   Resp 17   Ht 5' 2" (1.575 m)   Wt 52.3 kg (115 lb 6.6 oz)   SpO2 98%   Breastfeeding No   BMI 21.11 kg/m²     "

## 2022-11-09 NOTE — PLAN OF CARE
Pt states that she is interested in resources for child and personal therapy to deal with a family issue. Escalated to RICHARD Justin to advise next steps. Neena advises RICHARD Sandhu is the appropriate person to navigate pediatric issues. Attempts to contact [employer] via phone were unsuccessful. Epic secure chat initiated for next steps.

## 2022-11-09 NOTE — PLAN OF CARE
Discharge instructions reviewed with pt and spouse, handouts given, verbalized understanding with no further questions at this time. Dr. Peña spoke to pt at bedside, reviewed procedure and answered questions aware they are awaiting biopsy results with MD telephone number provided per AVS sheet. VSS on RA, no pain or nausea noted, tolerating po fluids without difficulty, no other complaints noted. Fall precautions reviewed, consents in chart, PIV to be removed at discharge.
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
## 2022-11-10 ENCOUNTER — INFUSION (OUTPATIENT)
Dept: INFUSION THERAPY | Facility: HOSPITAL | Age: 32
End: 2022-11-10
Attending: NURSE PRACTITIONER
Payer: MEDICAID

## 2022-11-10 VITALS
RESPIRATION RATE: 16 BRPM | DIASTOLIC BLOOD PRESSURE: 91 MMHG | SYSTOLIC BLOOD PRESSURE: 141 MMHG | TEMPERATURE: 98 F | OXYGEN SATURATION: 98 % | HEART RATE: 63 BPM

## 2022-11-10 DIAGNOSIS — R11.0 NAUSEA: Primary | ICD-10-CM

## 2022-11-10 DIAGNOSIS — D50.8 IRON DEFICIENCY ANEMIA SECONDARY TO INADEQUATE DIETARY IRON INTAKE: ICD-10-CM

## 2022-11-10 PROCEDURE — 63600175 PHARM REV CODE 636 W HCPCS: Performed by: NURSE PRACTITIONER

## 2022-11-10 PROCEDURE — 96374 THER/PROPH/DIAG INJ IV PUSH: CPT

## 2022-11-10 PROCEDURE — 96375 TX/PRO/DX INJ NEW DRUG ADDON: CPT

## 2022-11-10 RX ORDER — ONDANSETRON 2 MG/ML
4 INJECTION INTRAMUSCULAR; INTRAVENOUS
Status: COMPLETED | OUTPATIENT
Start: 2022-11-10 | End: 2022-11-10

## 2022-11-10 RX ORDER — METHYLPREDNISOLONE SOD SUCC 125 MG
40 VIAL (EA) INJECTION
Status: COMPLETED | OUTPATIENT
Start: 2022-11-10 | End: 2022-11-10

## 2022-11-10 RX ADMIN — IRON SUCROSE 200 MG: 20 INJECTION, SOLUTION INTRAVENOUS at 11:11

## 2022-11-10 RX ADMIN — METHYLPREDNISOLONE SODIUM SUCCINATE 40 MG: 125 INJECTION, POWDER, FOR SOLUTION INTRAMUSCULAR; INTRAVENOUS at 11:11

## 2022-11-10 RX ADMIN — ONDANSETRON 4 MG: 2 INJECTION INTRAMUSCULAR; INTRAVENOUS at 11:11

## 2022-11-10 NOTE — PLAN OF CARE
Discussed plan of care with pt. Addressed any and ongoing concerns. Pt denies    Problem: Adult Inpatient Plan of Care  Goal: Plan of Care Review  Outcome: Ongoing, Progressing  Flowsheets (Taken 11/10/2022 1416)  Plan of Care Reviewed With: patient  Goal: Patient-Specific Goal (Individualized)  Outcome: Ongoing, Progressing  Goal: Absence of Hospital-Acquired Illness or Injury  Outcome: Ongoing, Progressing  Intervention: Identify and Manage Fall Risk  Flowsheets (Taken 11/10/2022 1416)  Safety Promotion/Fall Prevention: in recliner, wheels locked  Intervention: Prevent Infection  Flowsheets (Taken 11/10/2022 1416)  Infection Prevention:   hand hygiene promoted   equipment surfaces disinfected  Goal: Optimal Comfort and Wellbeing  Outcome: Ongoing, Progressing  Intervention: Provide Person-Centered Care  Flowsheets (Taken 11/10/2022 1416)  Trust Relationship/Rapport:   care explained   reassurance provided   choices provided   thoughts/feelings acknowledged   emotional support provided   empathic listening provided   questions answered   questions encouraged     Problem: Anemia  Goal: Anemia Symptom Improvement  Outcome: Ongoing, Progressing  Intervention: Monitor and Manage Anemia  Flowsheets (Taken 11/10/2022 1416)  Safety Promotion/Fall Prevention: in recliner, wheels locked     
As above  agree

## 2022-11-14 ENCOUNTER — INFUSION (OUTPATIENT)
Dept: INFUSION THERAPY | Facility: HOSPITAL | Age: 32
End: 2022-11-14
Attending: NURSE PRACTITIONER
Payer: MEDICAID

## 2022-11-14 VITALS
RESPIRATION RATE: 16 BRPM | OXYGEN SATURATION: 99 % | HEART RATE: 63 BPM | DIASTOLIC BLOOD PRESSURE: 77 MMHG | SYSTOLIC BLOOD PRESSURE: 128 MMHG | TEMPERATURE: 98 F

## 2022-11-14 DIAGNOSIS — R11.0 NAUSEA: Primary | ICD-10-CM

## 2022-11-14 DIAGNOSIS — D50.8 IRON DEFICIENCY ANEMIA SECONDARY TO INADEQUATE DIETARY IRON INTAKE: ICD-10-CM

## 2022-11-14 PROCEDURE — 96375 TX/PRO/DX INJ NEW DRUG ADDON: CPT

## 2022-11-14 PROCEDURE — 25000003 PHARM REV CODE 250: Performed by: NURSE PRACTITIONER

## 2022-11-14 PROCEDURE — 63600175 PHARM REV CODE 636 W HCPCS: Performed by: NURSE PRACTITIONER

## 2022-11-14 PROCEDURE — 96374 THER/PROPH/DIAG INJ IV PUSH: CPT

## 2022-11-14 RX ORDER — METHYLPREDNISOLONE SOD SUCC 125 MG
40 VIAL (EA) INJECTION
Status: COMPLETED | OUTPATIENT
Start: 2022-11-14 | End: 2022-11-14

## 2022-11-14 RX ORDER — ONDANSETRON 2 MG/ML
4 INJECTION INTRAMUSCULAR; INTRAVENOUS
Status: COMPLETED | OUTPATIENT
Start: 2022-11-14 | End: 2022-11-14

## 2022-11-14 RX ADMIN — IRON SUCROSE 200 MG: 20 INJECTION, SOLUTION INTRAVENOUS at 12:11

## 2022-11-14 RX ADMIN — METHYLPREDNISOLONE SODIUM SUCCINATE 40 MG: 125 INJECTION, POWDER, FOR SOLUTION INTRAMUSCULAR; INTRAVENOUS at 11:11

## 2022-11-14 RX ADMIN — SODIUM CHLORIDE: 9 INJECTION, SOLUTION INTRAVENOUS at 11:11

## 2022-11-14 RX ADMIN — ONDANSETRON 4 MG: 2 INJECTION, SOLUTION INTRAMUSCULAR; INTRAVENOUS at 11:11

## 2022-11-14 NOTE — PLAN OF CARE
Patient states she feels weak and tired. Also stated that she has been feeling down and depressed but declines to speak to a SW. Denies any suicidal ideations or thoughts. Discussed POC.     Problem: Adult Inpatient Plan of Care  Goal: Plan of Care Review  Outcome: Ongoing, Progressing  Flowsheets (Taken 11/14/2022 1411)  Plan of Care Reviewed With: patient  Goal: Patient-Specific Goal (Individualized)  Outcome: Ongoing, Progressing  Flowsheets (Taken 11/14/2022 1411)  Anxieties, Fears or Concerns: No concerns expressed  Individualized Care Needs: Declined any confort measures at this time  Patient-Specific Goals (Include Timeframe): Tolerated treatment today  Goal: Absence of Hospital-Acquired Illness or Injury  Outcome: Ongoing, Progressing  Intervention: Identify and Manage Fall Risk  Flowsheets (Taken 11/14/2022 1411)  Safety Promotion/Fall Prevention:   in recliner, wheels locked   lighting adjusted   medications reviewed  Intervention: Prevent Infection  Flowsheets (Taken 11/14/2022 1411)  Infection Prevention:   hand hygiene promoted   personal protective equipment utilized  Goal: Optimal Comfort and Wellbeing  Outcome: Ongoing, Progressing  Intervention: Monitor Pain and Promote Comfort  Flowsheets (Taken 11/14/2022 1411)  Pain Management Interventions: relaxation techniques promoted  Intervention: Provide Person-Centered Care  Flowsheets (Taken 11/14/2022 1411)  Trust Relationship/Rapport:   care explained   choices provided   questions answered   questions encouraged     Problem: Anemia  Goal: Anemia Symptom Improvement  Outcome: Ongoing, Progressing  Intervention: Monitor and Manage Anemia  Flowsheets (Taken 11/14/2022 1411)  Safety Promotion/Fall Prevention:   in recliner, wheels locked   lighting adjusted   medications reviewed  Fatigue Management: frequent rest breaks encouraged

## 2022-11-14 NOTE — DISCHARGE INSTRUCTIONS
.[facility]  [address] [address]  [address]  [phone] phone     [phone] fax  Hours of Operation: Monday- Friday 8:00am- 5:00pm  After hours phone  [phone]  Hematology / Oncology Physicians on call    Dr. Quinten Bedolla      Nurse Practitioners:    Samanta Burnham, ANNE Heath, ANNE Manzanares, ANNE Rockwell, ANNE Mckeon, SHANNA Sheffield      Please don't hesitate to call if you have any concerns.

## 2022-11-18 LAB
FINAL PATHOLOGIC DIAGNOSIS: NORMAL
GROSS: NORMAL
Lab: NORMAL

## 2022-11-21 ENCOUNTER — INFUSION (OUTPATIENT)
Dept: INFUSION THERAPY | Facility: HOSPITAL | Age: 32
End: 2022-11-21
Attending: INTERNAL MEDICINE
Payer: MEDICAID

## 2022-11-21 VITALS
HEART RATE: 81 BPM | SYSTOLIC BLOOD PRESSURE: 116 MMHG | TEMPERATURE: 98 F | OXYGEN SATURATION: 98 % | DIASTOLIC BLOOD PRESSURE: 73 MMHG | RESPIRATION RATE: 16 BRPM

## 2022-11-21 DIAGNOSIS — R11.0 NAUSEA: Primary | ICD-10-CM

## 2022-11-21 DIAGNOSIS — D50.8 IRON DEFICIENCY ANEMIA SECONDARY TO INADEQUATE DIETARY IRON INTAKE: ICD-10-CM

## 2022-11-21 PROCEDURE — 96375 TX/PRO/DX INJ NEW DRUG ADDON: CPT

## 2022-11-21 PROCEDURE — 25000003 PHARM REV CODE 250: Performed by: NURSE PRACTITIONER

## 2022-11-21 PROCEDURE — 63600175 PHARM REV CODE 636 W HCPCS: Performed by: NURSE PRACTITIONER

## 2022-11-21 PROCEDURE — 96374 THER/PROPH/DIAG INJ IV PUSH: CPT

## 2022-11-21 RX ORDER — ONDANSETRON 2 MG/ML
4 INJECTION INTRAMUSCULAR; INTRAVENOUS
Status: COMPLETED | OUTPATIENT
Start: 2022-11-21 | End: 2022-11-21

## 2022-11-21 RX ORDER — METHYLPREDNISOLONE SOD SUCC 125 MG
40 VIAL (EA) INJECTION
Status: COMPLETED | OUTPATIENT
Start: 2022-11-21 | End: 2022-11-21

## 2022-11-21 RX ADMIN — SODIUM CHLORIDE: 0.9 INJECTION, SOLUTION INTRAVENOUS at 02:11

## 2022-11-21 RX ADMIN — ONDANSETRON 4 MG: 2 INJECTION INTRAMUSCULAR; INTRAVENOUS at 02:11

## 2022-11-21 RX ADMIN — IRON SUCROSE 200 MG: 20 INJECTION, SOLUTION INTRAVENOUS at 02:11

## 2022-11-21 RX ADMIN — METHYLPREDNISOLONE SODIUM SUCCINATE 40 MG: 125 INJECTION, POWDER, FOR SOLUTION INTRAMUSCULAR; INTRAVENOUS at 02:11

## 2022-11-21 NOTE — PLAN OF CARE
Patient reports feeling weak, tired, and having headache. Tolerated IV iron well today with no adverse reactions. Patient to return to clinic on Friday for last dose of iron.

## 2022-11-21 NOTE — DISCHARGE INSTRUCTIONS
.[facility]  [address] [address]  [address]  [phone] phone     [phone] fax  Hours of Operation: Monday- Friday 8:00am- 5:00pm  After hours phone  [phone]  Hematology / Oncology Physicians on call    Dr. Quinten Bedolla      Nurse Practitioners:    Samanta Burnham, ANNE Heath, ANNE Manzanares, ANNE Rockwell, ANNE Mckeon, SHANNA Sheffield      Please don't hesitate to call if you have any concerns.

## 2022-12-07 NOTE — ANESTHESIA POSTPROCEDURE EVALUATION
Anesthesia Post Evaluation    Patient: Jaymie Wilhelm    Procedure(s) Performed: Procedure(s) (LRB):  EGD (ESOPHAGOGASTRODUODENOSCOPY) (N/A)    Final Anesthesia Type: MAC      Patient location during evaluation: PACU  Patient participation: Yes- Able to Participate  Level of consciousness: awake and alert and oriented  Post-procedure vital signs: reviewed and stable  Pain management: adequate  Airway patency: patent    PONV status at discharge: No PONV  Anesthetic complications: no      Cardiovascular status: hemodynamically stable  Respiratory status: unassisted  Hydration status: euvolemic  Follow-up not needed.          Vitals Value Taken Time   /73 11/21/22 1433   Temp 36.7 °C (98 °F) 11/21/22 1433   Pulse 81 11/21/22 1433   Resp 16 11/21/22 1433   SpO2 98 % 11/21/22 1433         Event Time   Out of Recovery 12:21:31         Pain/Hi Score: No data recorded

## 2023-01-09 ENCOUNTER — TELEPHONE (OUTPATIENT)
Dept: HEMATOLOGY/ONCOLOGY | Facility: CLINIC | Age: 33
End: 2023-01-09
Payer: MEDICAID

## 2023-01-09 NOTE — TELEPHONE ENCOUNTER
N/a lvm informing pt her lab has been rescheduled due to incomplete lab work.pt has been rescheduled. Nurse advised pt on vm to call back with any questions or concerns

## 2023-02-06 ENCOUNTER — TELEPHONE (OUTPATIENT)
Dept: HEMATOLOGY/ONCOLOGY | Facility: CLINIC | Age: 33
End: 2023-02-06
Payer: MEDICAID

## 2023-04-13 ENCOUNTER — HOSPITAL ENCOUNTER (INPATIENT)
Facility: HOSPITAL | Age: 33
LOS: 2 days | Discharge: HOME | DRG: 442 | End: 2023-04-15
Attending: EMERGENCY MEDICINE | Admitting: HOSPITALIST
Payer: MEDICAID

## 2023-04-13 DIAGNOSIS — I10 CHRONIC HYPERTENSION: Primary | ICD-10-CM

## 2023-04-13 DIAGNOSIS — R74.01 TRANSAMINITIS: ICD-10-CM

## 2023-04-13 DIAGNOSIS — R07.9 CHEST PAIN: ICD-10-CM

## 2023-04-13 DIAGNOSIS — A41.9 SEPSIS: ICD-10-CM

## 2023-04-13 LAB
ALBUMIN SERPL BCP-MCNC: 3 G/DL (ref 3.5–5.2)
ALP SERPL-CCNC: 345 U/L (ref 55–135)
ALT SERPL W/O P-5'-P-CCNC: 232 U/L (ref 10–44)
ANION GAP SERPL CALC-SCNC: 10 MMOL/L (ref 8–16)
AST SERPL-CCNC: 812 U/L (ref 10–40)
BASOPHILS # BLD AUTO: 0.03 K/UL (ref 0–0.2)
BASOPHILS NFR BLD: 0.3 % (ref 0–1.9)
BILIRUB SERPL-MCNC: 1 MG/DL (ref 0.1–1)
BILIRUB UR QL STRIP: NEGATIVE
BUN SERPL-MCNC: 17 MG/DL (ref 6–20)
CALCIUM SERPL-MCNC: 8.5 MG/DL (ref 8.7–10.5)
CHLORIDE SERPL-SCNC: 103 MMOL/L (ref 95–110)
CLARITY UR: ABNORMAL
CO2 SERPL-SCNC: 21 MMOL/L (ref 23–29)
COLOR UR: YELLOW
CREAT SERPL-MCNC: 0.7 MG/DL (ref 0.5–1.4)
CRP SERPL-MCNC: 15.4 MG/L (ref 0–8.2)
DIFFERENTIAL METHOD: ABNORMAL
EOSINOPHIL # BLD AUTO: 0 K/UL (ref 0–0.5)
EOSINOPHIL NFR BLD: 0.1 % (ref 0–8)
ERYTHROCYTE [DISTWIDTH] IN BLOOD BY AUTOMATED COUNT: 12.7 % (ref 11.5–14.5)
ERYTHROCYTE [SEDIMENTATION RATE] IN BLOOD BY WESTERGREN METHOD: 5 MM/HR (ref 0–20)
EST. GFR  (NO RACE VARIABLE): >60 ML/MIN/1.73 M^2
GLUCOSE SERPL-MCNC: 99 MG/DL (ref 70–110)
GLUCOSE UR QL STRIP: NEGATIVE
HCT VFR BLD AUTO: 41.9 % (ref 37–48.5)
HGB BLD-MCNC: 13.7 G/DL (ref 12–16)
HGB UR QL STRIP: NEGATIVE
IMM GRANULOCYTES # BLD AUTO: 0.03 K/UL (ref 0–0.04)
IMM GRANULOCYTES NFR BLD AUTO: 0.3 % (ref 0–0.5)
KETONES UR QL STRIP: NEGATIVE
LACTATE SERPL-SCNC: 1.4 MMOL/L (ref 0.5–2.2)
LACTATE SERPL-SCNC: 2.6 MMOL/L (ref 0.5–2.2)
LEUKOCYTE ESTERASE UR QL STRIP: ABNORMAL
LIPASE SERPL-CCNC: 10 U/L (ref 4–60)
LYMPHOCYTES # BLD AUTO: 0.4 K/UL (ref 1–4.8)
LYMPHOCYTES NFR BLD: 4 % (ref 18–48)
MCH RBC QN AUTO: 28.8 PG (ref 27–31)
MCHC RBC AUTO-ENTMCNC: 32.7 G/DL (ref 32–36)
MCV RBC AUTO: 88 FL (ref 82–98)
MICROSCOPIC COMMENT: ABNORMAL
MONOCYTES # BLD AUTO: 0 K/UL (ref 0.3–1)
MONOCYTES NFR BLD: 0.2 % (ref 4–15)
NEUTROPHILS # BLD AUTO: 8.6 K/UL (ref 1.8–7.7)
NEUTROPHILS NFR BLD: 95.1 % (ref 38–73)
NITRITE UR QL STRIP: NEGATIVE
NRBC BLD-RTO: 0 /100 WBC
PH UR STRIP: 6 [PH] (ref 5–8)
PLATELET # BLD AUTO: 289 K/UL (ref 150–450)
PMV BLD AUTO: 9.5 FL (ref 9.2–12.9)
POTASSIUM SERPL-SCNC: 4 MMOL/L (ref 3.5–5.1)
PROT SERPL-MCNC: 6.1 G/DL (ref 6–8.4)
PROT UR QL STRIP: NEGATIVE
RBC # BLD AUTO: 4.76 M/UL (ref 4–5.4)
RBC #/AREA URNS HPF: 3 /HPF (ref 0–4)
SODIUM SERPL-SCNC: 134 MMOL/L (ref 136–145)
SP GR UR STRIP: 1.02 (ref 1–1.03)
SQUAMOUS #/AREA URNS HPF: 8 /HPF
UNIDENT CRYS URNS QL MICRO: ABNORMAL
URN SPEC COLLECT METH UR: ABNORMAL
UROBILINOGEN UR STRIP-ACNC: ABNORMAL EU/DL
WBC # BLD AUTO: 9.09 K/UL (ref 3.9–12.7)
WBC #/AREA URNS HPF: 5 /HPF (ref 0–5)
WBC CLUMPS URNS QL MICRO: ABNORMAL

## 2023-04-13 PROCEDURE — 80307 DRUG TEST PRSMV CHEM ANLYZR: CPT | Performed by: EMERGENCY MEDICINE

## 2023-04-13 PROCEDURE — 25000003 PHARM REV CODE 250: Performed by: EMERGENCY MEDICINE

## 2023-04-13 PROCEDURE — 85025 COMPLETE CBC W/AUTO DIFF WBC: CPT | Performed by: EMERGENCY MEDICINE

## 2023-04-13 PROCEDURE — 99285 EMERGENCY DEPT VISIT HI MDM: CPT | Mod: 25

## 2023-04-13 PROCEDURE — 83605 ASSAY OF LACTIC ACID: CPT | Mod: 91 | Performed by: EMERGENCY MEDICINE

## 2023-04-13 PROCEDURE — 63600175 PHARM REV CODE 636 W HCPCS: Performed by: EMERGENCY MEDICINE

## 2023-04-13 PROCEDURE — 86140 C-REACTIVE PROTEIN: CPT | Performed by: EMERGENCY MEDICINE

## 2023-04-13 PROCEDURE — 81000 URINALYSIS NONAUTO W/SCOPE: CPT | Mod: 59 | Performed by: EMERGENCY MEDICINE

## 2023-04-13 PROCEDURE — 96374 THER/PROPH/DIAG INJ IV PUSH: CPT | Mod: 59

## 2023-04-13 PROCEDURE — 83690 ASSAY OF LIPASE: CPT | Performed by: EMERGENCY MEDICINE

## 2023-04-13 PROCEDURE — 86592 SYPHILIS TEST NON-TREP QUAL: CPT | Performed by: STUDENT IN AN ORGANIZED HEALTH CARE EDUCATION/TRAINING PROGRAM

## 2023-04-13 PROCEDURE — 87040 BLOOD CULTURE FOR BACTERIA: CPT | Performed by: EMERGENCY MEDICINE

## 2023-04-13 PROCEDURE — 80053 COMPREHEN METABOLIC PANEL: CPT | Performed by: EMERGENCY MEDICINE

## 2023-04-13 PROCEDURE — 85651 RBC SED RATE NONAUTOMATED: CPT | Performed by: EMERGENCY MEDICINE

## 2023-04-13 PROCEDURE — 96365 THER/PROPH/DIAG IV INF INIT: CPT

## 2023-04-13 PROCEDURE — 25500020 PHARM REV CODE 255: Performed by: EMERGENCY MEDICINE

## 2023-04-13 PROCEDURE — 11000001 HC ACUTE MED/SURG PRIVATE ROOM

## 2023-04-13 RX ORDER — ACETAMINOPHEN 500 MG
1000 TABLET ORAL
Status: COMPLETED | OUTPATIENT
Start: 2023-04-13 | End: 2023-04-13

## 2023-04-13 RX ORDER — IBUPROFEN 800 MG/1
800 TABLET ORAL
Status: COMPLETED | OUTPATIENT
Start: 2023-04-13 | End: 2023-04-13

## 2023-04-13 RX ADMIN — SODIUM CHLORIDE, POTASSIUM CHLORIDE, SODIUM LACTATE AND CALCIUM CHLORIDE 1000 ML: 600; 310; 30; 20 INJECTION, SOLUTION INTRAVENOUS at 06:04

## 2023-04-13 RX ADMIN — IOHEXOL 100 ML: 350 INJECTION, SOLUTION INTRAVENOUS at 08:04

## 2023-04-13 RX ADMIN — IBUPROFEN 800 MG: 800 TABLET, FILM COATED ORAL at 06:04

## 2023-04-13 RX ADMIN — VANCOMYCIN HYDROCHLORIDE 1500 MG: 1.5 INJECTION, POWDER, LYOPHILIZED, FOR SOLUTION INTRAVENOUS at 09:04

## 2023-04-13 RX ADMIN — CEFEPIME 2 G: 2 INJECTION, POWDER, FOR SOLUTION INTRAVENOUS at 08:04

## 2023-04-13 RX ADMIN — ACETAMINOPHEN 1000 MG: 500 TABLET ORAL at 06:04
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
## 2023-04-14 ENCOUNTER — CLINICAL SUPPORT (OUTPATIENT)
Dept: SMOKING CESSATION | Facility: CLINIC | Age: 33
End: 2023-04-14

## 2023-04-14 DIAGNOSIS — F17.200 NICOTINE DEPENDENCE: Primary | ICD-10-CM

## 2023-04-14 PROBLEM — R10.30 LOWER ABDOMINAL PAIN: Status: ACTIVE | Noted: 2023-04-14

## 2023-04-14 PROBLEM — A41.9 SEPSIS: Status: ACTIVE | Noted: 2023-04-14

## 2023-04-14 PROBLEM — D72.829 LEUKOCYTOSIS: Status: ACTIVE | Noted: 2023-04-14

## 2023-04-14 PROBLEM — R74.01 TRANSAMINITIS: Status: ACTIVE | Noted: 2023-04-14

## 2023-04-14 PROBLEM — M54.50 LUMBAR BACK PAIN: Status: ACTIVE | Noted: 2023-04-14

## 2023-04-14 LAB
ALBUMIN SERPL BCP-MCNC: 2.7 G/DL (ref 3.5–5.2)
ALP SERPL-CCNC: 249 U/L (ref 55–135)
ALT SERPL W/O P-5'-P-CCNC: 184 U/L (ref 10–44)
AMPHET+METHAMPHET UR QL: ABNORMAL
ANION GAP SERPL CALC-SCNC: 8 MMOL/L (ref 8–16)
ANISOCYTOSIS BLD QL SMEAR: SLIGHT
APAP SERPL-MCNC: <3 UG/ML (ref 10–20)
AST SERPL-CCNC: 231 U/L (ref 10–40)
AV INDEX (PROSTH): 0.79
AV MEAN GRADIENT: 3 MMHG
AV PEAK GRADIENT: 6 MMHG
AV VALVE AREA: 2.41 CM2
AV VELOCITY RATIO: 0.83
BARBITURATES UR QL SCN>200 NG/ML: NEGATIVE
BASOPHILS # BLD AUTO: 0.04 K/UL (ref 0–0.2)
BASOPHILS NFR BLD: 0.1 % (ref 0–1.9)
BENZODIAZ UR QL SCN>200 NG/ML: ABNORMAL
BILIRUB SERPL-MCNC: 1.8 MG/DL (ref 0.1–1)
BSA FOR ECHO PROCEDURE: 1.59 M2
BUN SERPL-MCNC: 12 MG/DL (ref 6–20)
BZE UR QL SCN: NEGATIVE
CALCIUM SERPL-MCNC: 8.3 MG/DL (ref 8.7–10.5)
CANNABINOIDS UR QL SCN: NEGATIVE
CHLORIDE SERPL-SCNC: 105 MMOL/L (ref 95–110)
CO2 SERPL-SCNC: 24 MMOL/L (ref 23–29)
CREAT SERPL-MCNC: 0.6 MG/DL (ref 0.5–1.4)
CREAT UR-MCNC: 86.2 MG/DL (ref 15–325)
CV ECHO LV RWT: 0.3 CM
DACRYOCYTES BLD QL SMEAR: ABNORMAL
DIFFERENTIAL METHOD: ABNORMAL
DOP CALC AO PEAK VEL: 1.19 M/S
DOP CALC AO VTI: 25.8 CM
DOP CALC LVOT AREA: 3 CM2
DOP CALC LVOT DIAMETER: 1.97 CM
DOP CALC LVOT PEAK VEL: 0.99 M/S
DOP CALC LVOT STROKE VOLUME: 62.15 CM3
DOP CALCLVOT PEAK VEL VTI: 20.4 CM
E WAVE DECELERATION TIME: 148.59 MSEC
E/A RATIO: 1.2
E/E' RATIO: 5.06 M/S
ECHO LV POSTERIOR WALL: 0.77 CM (ref 0.6–1.1)
EJECTION FRACTION: 65 %
EOSINOPHIL # BLD AUTO: 0 K/UL (ref 0–0.5)
EOSINOPHIL NFR BLD: 0.1 % (ref 0–8)
ERYTHROCYTE [DISTWIDTH] IN BLOOD BY AUTOMATED COUNT: 13.2 % (ref 11.5–14.5)
EST. GFR  (NO RACE VARIABLE): >60 ML/MIN/1.73 M^2
FRACTIONAL SHORTENING: 34 % (ref 28–44)
GLUCOSE SERPL-MCNC: 71 MG/DL (ref 70–110)
HCT VFR BLD AUTO: 38 % (ref 37–48.5)
HGB BLD-MCNC: 12.6 G/DL (ref 12–16)
HIV 1+2 AB+HIV1 P24 AG SERPL QL IA: NEGATIVE
IMM GRANULOCYTES # BLD AUTO: 0.38 K/UL (ref 0–0.04)
IMM GRANULOCYTES NFR BLD AUTO: 1.1 % (ref 0–0.5)
INTERVENTRICULAR SEPTUM: 0.8 CM (ref 0.6–1.1)
IVC DIAMETER: 1.35 CM
LA MAJOR: 4.43 CM
LA MINOR: 4.1 CM
LA WIDTH: 3.3 CM
LEFT ATRIUM SIZE: 2.91 CM
LEFT ATRIUM VOLUME INDEX: 22 ML/M2
LEFT ATRIUM VOLUME: 34.76 CM3
LEFT INTERNAL DIMENSION IN SYSTOLE: 3.44 CM (ref 2.1–4)
LEFT VENTRICLE DIASTOLIC VOLUME INDEX: 81.79 ML/M2
LEFT VENTRICLE DIASTOLIC VOLUME: 129.23 ML
LEFT VENTRICLE MASS INDEX: 90 G/M2
LEFT VENTRICLE SYSTOLIC VOLUME INDEX: 31 ML/M2
LEFT VENTRICLE SYSTOLIC VOLUME: 48.94 ML
LEFT VENTRICULAR INTERNAL DIMENSION IN DIASTOLE: 5.2 CM (ref 3.5–6)
LEFT VENTRICULAR MASS: 141.77 G
LV LATERAL E/E' RATIO: 4.33 M/S
LV SEPTAL E/E' RATIO: 6.07 M/S
LVOT MG: 2.1 MMHG
LVOT MV: 0.67 CM/S
LYMPHOCYTES # BLD AUTO: 1.9 K/UL (ref 1–4.8)
LYMPHOCYTES NFR BLD: 5.4 % (ref 18–48)
MCH RBC QN AUTO: 29.4 PG (ref 27–31)
MCHC RBC AUTO-ENTMCNC: 33.2 G/DL (ref 32–36)
MCV RBC AUTO: 89 FL (ref 82–98)
METHADONE UR QL SCN>300 NG/ML: NEGATIVE
MONOCYTES # BLD AUTO: 1.6 K/UL (ref 0.3–1)
MONOCYTES NFR BLD: 4.5 % (ref 4–15)
MV PEAK A VEL: 0.76 M/S
MV PEAK E VEL: 0.91 M/S
NEUTROPHILS # BLD AUTO: 31.3 K/UL (ref 1.8–7.7)
NEUTROPHILS NFR BLD: 88.8 % (ref 38–73)
NRBC BLD-RTO: 0 /100 WBC
OPIATES UR QL SCN: NEGATIVE
PCP UR QL SCN>25 NG/ML: NEGATIVE
PISA TR MAX VEL: 2.14 M/S
PLATELET # BLD AUTO: 262 K/UL (ref 150–450)
PLATELET BLD QL SMEAR: ABNORMAL
PMV BLD AUTO: 10 FL (ref 9.2–12.9)
POTASSIUM SERPL-SCNC: 3.8 MMOL/L (ref 3.5–5.1)
PROT SERPL-MCNC: 5.7 G/DL (ref 6–8.4)
PV MV: 0.59 M/S
PV PEAK VELOCITY: 0.84 CM/S
RA MAJOR: 4.22 CM
RA PRESSURE: 3 MMHG
RBC # BLD AUTO: 4.29 M/UL (ref 4–5.4)
RPR SER QL: NORMAL
SODIUM SERPL-SCNC: 137 MMOL/L (ref 136–145)
STJ: 1.89 CM
TARGETS BLD QL SMEAR: ABNORMAL
TDI LATERAL: 0.21 M/S
TDI SEPTAL: 0.15 M/S
TDI: 0.18 M/S
TOXICOLOGY INFORMATION: ABNORMAL
TR MAX PG: 18 MMHG
TRICUSPID ANNULAR PLANE SYSTOLIC EXCURSION: 2 CM
TV REST PULMONARY ARTERY PRESSURE: 21 MMHG
WBC # BLD AUTO: 35.25 K/UL (ref 3.9–12.7)

## 2023-04-14 PROCEDURE — 99406 BEHAV CHNG SMOKING 3-10 MIN: CPT | Mod: S$GLB,,,

## 2023-04-14 PROCEDURE — 11000001 HC ACUTE MED/SURG PRIVATE ROOM

## 2023-04-14 PROCEDURE — 25500020 PHARM REV CODE 255: Performed by: FAMILY MEDICINE

## 2023-04-14 PROCEDURE — 80143 DRUG ASSAY ACETAMINOPHEN: CPT | Performed by: FAMILY MEDICINE

## 2023-04-14 PROCEDURE — 25000003 PHARM REV CODE 250: Performed by: FAMILY MEDICINE

## 2023-04-14 PROCEDURE — 63600175 PHARM REV CODE 636 W HCPCS: Performed by: HOSPITALIST

## 2023-04-14 PROCEDURE — 36415 COLL VENOUS BLD VENIPUNCTURE: CPT | Performed by: HOSPITALIST

## 2023-04-14 PROCEDURE — 85025 COMPLETE CBC W/AUTO DIFF WBC: CPT | Performed by: HOSPITALIST

## 2023-04-14 PROCEDURE — 25000003 PHARM REV CODE 250: Performed by: HOSPITALIST

## 2023-04-14 PROCEDURE — 63600175 PHARM REV CODE 636 W HCPCS: Performed by: STUDENT IN AN ORGANIZED HEALTH CARE EDUCATION/TRAINING PROGRAM

## 2023-04-14 PROCEDURE — 99406 PT REFUSED TOBACCO CESSATION: ICD-10-PCS | Mod: S$GLB,,,

## 2023-04-14 PROCEDURE — 36415 COLL VENOUS BLD VENIPUNCTURE: CPT | Performed by: FAMILY MEDICINE

## 2023-04-14 PROCEDURE — A9585 GADOBUTROL INJECTION: HCPCS | Performed by: FAMILY MEDICINE

## 2023-04-14 PROCEDURE — 80053 COMPREHEN METABOLIC PANEL: CPT | Performed by: HOSPITALIST

## 2023-04-14 PROCEDURE — 99900035 HC TECH TIME PER 15 MIN (STAT)

## 2023-04-14 PROCEDURE — 63600175 PHARM REV CODE 636 W HCPCS: Performed by: FAMILY MEDICINE

## 2023-04-14 PROCEDURE — 25000003 PHARM REV CODE 250: Performed by: STUDENT IN AN ORGANIZED HEALTH CARE EDUCATION/TRAINING PROGRAM

## 2023-04-14 PROCEDURE — 87389 HIV-1 AG W/HIV-1&-2 AB AG IA: CPT | Performed by: FAMILY MEDICINE

## 2023-04-14 RX ORDER — MORPHINE SULFATE 2 MG/ML
2 INJECTION, SOLUTION INTRAMUSCULAR; INTRAVENOUS EVERY 4 HOURS PRN
Status: DISCONTINUED | OUTPATIENT
Start: 2023-04-14 | End: 2023-04-15

## 2023-04-14 RX ORDER — ACETAMINOPHEN 650 MG/1
650 SUPPOSITORY RECTAL EVERY 6 HOURS PRN
Status: DISCONTINUED | OUTPATIENT
Start: 2023-04-14 | End: 2023-04-15

## 2023-04-14 RX ORDER — ENOXAPARIN SODIUM 100 MG/ML
40 INJECTION SUBCUTANEOUS EVERY 24 HOURS
Status: DISCONTINUED | OUTPATIENT
Start: 2023-04-14 | End: 2023-04-15 | Stop reason: HOSPADM

## 2023-04-14 RX ORDER — DEXTROSE 40 %
15 GEL (GRAM) ORAL
Status: DISCONTINUED | OUTPATIENT
Start: 2023-04-14 | End: 2023-04-15 | Stop reason: HOSPADM

## 2023-04-14 RX ORDER — DEXTROSE 40 %
30 GEL (GRAM) ORAL
Status: DISCONTINUED | OUTPATIENT
Start: 2023-04-14 | End: 2023-04-15 | Stop reason: HOSPADM

## 2023-04-14 RX ORDER — SODIUM CHLORIDE, SODIUM LACTATE, POTASSIUM CHLORIDE, CALCIUM CHLORIDE 600; 310; 30; 20 MG/100ML; MG/100ML; MG/100ML; MG/100ML
INJECTION, SOLUTION INTRAVENOUS CONTINUOUS
Status: DISCONTINUED | OUTPATIENT
Start: 2023-04-14 | End: 2023-04-15 | Stop reason: HOSPADM

## 2023-04-14 RX ORDER — NALOXONE HCL 0.4 MG/ML
0.02 VIAL (ML) INJECTION
Status: DISCONTINUED | OUTPATIENT
Start: 2023-04-14 | End: 2023-04-15 | Stop reason: HOSPADM

## 2023-04-14 RX ORDER — ACETAMINOPHEN 325 MG/1
650 TABLET ORAL EVERY 8 HOURS PRN
Status: DISCONTINUED | OUTPATIENT
Start: 2023-04-14 | End: 2023-04-15

## 2023-04-14 RX ORDER — SODIUM CHLORIDE 0.9 % (FLUSH) 0.9 %
3 SYRINGE (ML) INJECTION EVERY 12 HOURS PRN
Status: DISCONTINUED | OUTPATIENT
Start: 2023-04-14 | End: 2023-04-15 | Stop reason: HOSPADM

## 2023-04-14 RX ORDER — HYDROCODONE BITARTRATE AND ACETAMINOPHEN 5; 325 MG/1; MG/1
1 TABLET ORAL EVERY 6 HOURS PRN
Status: DISCONTINUED | OUTPATIENT
Start: 2023-04-14 | End: 2023-04-15

## 2023-04-14 RX ORDER — TALC
6 POWDER (GRAM) TOPICAL NIGHTLY PRN
Status: DISCONTINUED | OUTPATIENT
Start: 2023-04-14 | End: 2023-04-15 | Stop reason: HOSPADM

## 2023-04-14 RX ORDER — GLUCAGON 1 MG
1 KIT INJECTION
Status: DISCONTINUED | OUTPATIENT
Start: 2023-04-14 | End: 2023-04-15 | Stop reason: HOSPADM

## 2023-04-14 RX ORDER — MAG HYDROX/ALUMINUM HYD/SIMETH 200-200-20
30 SUSPENSION, ORAL (FINAL DOSE FORM) ORAL 4 TIMES DAILY PRN
Status: DISCONTINUED | OUTPATIENT
Start: 2023-04-14 | End: 2023-04-15 | Stop reason: HOSPADM

## 2023-04-14 RX ORDER — AMOXICILLIN 250 MG
1 CAPSULE ORAL 2 TIMES DAILY PRN
Status: DISCONTINUED | OUTPATIENT
Start: 2023-04-14 | End: 2023-04-15 | Stop reason: HOSPADM

## 2023-04-14 RX ORDER — PROMETHAZINE HYDROCHLORIDE 25 MG/1
25 TABLET ORAL EVERY 6 HOURS PRN
Status: DISCONTINUED | OUTPATIENT
Start: 2023-04-14 | End: 2023-04-15 | Stop reason: HOSPADM

## 2023-04-14 RX ORDER — ONDANSETRON 2 MG/ML
4 INJECTION INTRAMUSCULAR; INTRAVENOUS EVERY 8 HOURS PRN
Status: DISCONTINUED | OUTPATIENT
Start: 2023-04-14 | End: 2023-04-15 | Stop reason: HOSPADM

## 2023-04-14 RX ORDER — GADOBUTROL 604.72 MG/ML
10 INJECTION INTRAVENOUS
Status: COMPLETED | OUTPATIENT
Start: 2023-04-14 | End: 2023-04-14

## 2023-04-14 RX ADMIN — MORPHINE SULFATE 2 MG: 2 INJECTION, SOLUTION INTRAMUSCULAR; INTRAVENOUS at 12:04

## 2023-04-14 RX ADMIN — VANCOMYCIN HYDROCHLORIDE 750 MG: 750 INJECTION, POWDER, LYOPHILIZED, FOR SOLUTION INTRAVENOUS at 11:04

## 2023-04-14 RX ADMIN — MORPHINE SULFATE 2 MG: 2 INJECTION, SOLUTION INTRAMUSCULAR; INTRAVENOUS at 11:04

## 2023-04-14 RX ADMIN — GADOBUTROL 5 ML: 604.72 INJECTION INTRAVENOUS at 04:04

## 2023-04-14 RX ADMIN — LORAZEPAM 0.5 MG: 2 INJECTION INTRAMUSCULAR; INTRAVENOUS at 03:04

## 2023-04-14 RX ADMIN — CEFEPIME 2 G: 2 INJECTION, POWDER, FOR SOLUTION INTRAVENOUS at 05:04

## 2023-04-14 RX ADMIN — SODIUM CHLORIDE, SODIUM LACTATE, POTASSIUM CHLORIDE, AND CALCIUM CHLORIDE: 600; 310; 30; 20 INJECTION, SOLUTION INTRAVENOUS at 03:04

## 2023-04-14 RX ADMIN — VANCOMYCIN HYDROCHLORIDE 750 MG: 750 INJECTION, POWDER, LYOPHILIZED, FOR SOLUTION INTRAVENOUS at 09:04

## 2023-04-14 RX ADMIN — SODIUM CHLORIDE, SODIUM LACTATE, POTASSIUM CHLORIDE, AND CALCIUM CHLORIDE: 600; 310; 30; 20 INJECTION, SOLUTION INTRAVENOUS at 08:04

## 2023-04-14 RX ADMIN — CEFEPIME 2 G: 2 INJECTION, POWDER, FOR SOLUTION INTRAVENOUS at 08:04

## 2023-04-14 RX ADMIN — ACETAMINOPHEN 650 MG: 325 TABLET ORAL at 05:04

## 2023-04-14 RX ADMIN — MORPHINE SULFATE 2 MG: 2 INJECTION, SOLUTION INTRAMUSCULAR; INTRAVENOUS at 06:04

## 2023-04-14 RX ADMIN — ENOXAPARIN SODIUM 40 MG: 40 INJECTION SUBCUTANEOUS at 05:04
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
## 2023-04-14 NOTE — PLAN OF CARE
Patient has remained free of falls and injuries this shift. VSS. No obvious s/sx of distress noted. Pain managed with PRN medications. Patient was in the shower for a a few hours, IV antibiotics were delayed due to this. Once patient  was out of the shower she was taken for MRI, antibiotics will be hung once patient returns. Continue POC as ordered, chart check completed and all orders reviewed.

## 2023-04-14 NOTE — PROGRESS NOTES
[facility] Medicine  Progress Note    Patient Name: Jaymie Wilhelm  MRN: [id number]  Patient Class: IP- Inpatient   Admission Date: 4/13/2023  Length of Stay: 1 days  Attending Physician: Nando Anguiano MD  Primary Care Provider: Primary Doctor No        Subjective:     Principal Problem:<principal problem not specified>        HPI:  Jaymie Wilhelm is a 33 y.o. female with a PMH  has a past medical history of Anxiety disorder, Chronic hepatitis C without hepatic coma (12/31/2018), Chronic hypertension (2/12/2019), Drug abuse, First degree perineal laceration during delivery (1/22/2015), Gestational diabetes, Hepatitis C (2016), Iron deficiency anemia secondary to inadequate dietary iron intake (11/5/2019), Seizures, and Thyroid disease. who presented to the ED complaining of worsening generalized abdominal pain worsened her lower quadrant with radiation to her back x3 weeks duration.  Patient was seen at [facility] last night for similar complaints and was discharged home with no clear indication of etiology of symptoms.  Patient currently endorsing lower abdominal pain described as hurting in nature, constant, currently rated 6/10 in severity with no known alleviating or aggravating factors noted.  Patient also complaining of lower back pain with left paraspinal greater than right paraspinal greater than midline described as sharp/stabbing in nature, constant, currently rated 10/10 in severity with radiation down bilateral lower extremities with left greater than right.  Patient reported aggravating factors included weight-bearing, movement, and palpation to the affected area with no known alleviating factors.  Associated symptoms included right lower extremity numbness but denied endorsing any bowel/bladder incontinence.  Patient also reported endorsing flu-like symptoms over the past week again with no known alleviating or aggravating factors.  Symptoms included subjective fevers with T-max  measuring 100.6 F, generalized malaise, fatigue, but denied endorsing any lightheadedness, dizziness, headaches, visual changes, chills, sweats, nausea, vomiting, rhinorrhea, congestion, cough, phlegm production, chest pain, shortness of breath, dysuria, hematuria, melena, hematochezia, or diarrhea, and reported all other review of systems negative except as noted above.  Patient does report history of IV drug abuse and injected heroin aproximal 3 months ago.  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  Patient also contracted hepatitis-C and again did not receive treatment.  Workup from ED visit at [facility] revealed negative gonorrhea/chlamydia, negative wet prep, negative hepatitis-B, positive hepatitis C antibody, negative pregnancy, alk phosphatase 117, AST/ALT 81/70, pelvic and transvaginal ultrasound positive for right ovarian complex heterogeneous follicle, heterogeneous right adnexal /posterior cul-de-sac fluid with clinical correlation to ectopic pregnancy.  Repeat workup in the ED tonight showed worsening transaminitis with alk phosphatase now measuring 345, AST/ALT a 12/232 respectfully, lactic acid 2.6, CRP 15.4, but is without leukocytosis and normal sed rate.  Lipase within normal limits.  Patient denied abuse of Tylenol, consumption of mushrooms, or abuse of medications.  Prior to onset of symptoms, patient reported being in her usual state of health with no other concerns or complaints.  Patient being admitted to Hospital Medicine inpatient for further workup of transaminitis and osteomyelitis.    PCP: Primary Doctor No        Overview/Hospital Course:  4/14 admitted for transaminitis, which is improving. Patient meeting criteria for sepsis. On vanc and cefepime. Source control. Unknown at this time. Pregnancy negative. Remains lethargic. States relapsing with using meth.  Previously on suboxone and tried to wean off while incarcerated. Mri and echocardiogram pending. ivdu one year ago.      Interval History: See hospital course for today      Review of Systems   Unable to perform ROS: Acuity of condition   Objective:     Vital Signs (Most Recent):  Temp: 97.8 °F (36.6 °C) (04/14/23 1203)  Pulse: 71 (04/14/23 1203)  Resp: 16 (04/14/23 1221)  BP: (!) 103/57 (04/14/23 1203)  SpO2: 100 % (04/14/23 1203)   Vital Signs (24h Range):  Temp:  [97 °F (36.1 °C)-100.6 °F (38.1 °C)] 97.8 °F (36.6 °C)  Pulse:  [] 71  Resp:  [14-18] 16  SpO2:  [95 %-100 %] 100 %  BP: ()/(55-72) 103/57     Weight: 57.6 kg (126 lb 15.8 oz)  Body mass index is 23.23 kg/m².  No intake or output data in the 24 hours ending 04/14/23 1231   Physical Exam  Vitals and nursing note reviewed.   Constitutional:       General: She is sleeping. She is not in acute distress.     Appearance: She is ill-appearing. She is not toxic-appearing.   HENT:      Head: Normocephalic and atraumatic.   Neck:      Meningeal: Brudzinski's sign absent.   Cardiovascular:      Rate and Rhythm: Normal rate.   Pulmonary:      Effort: Pulmonary effort is normal. No respiratory distress.   Abdominal:      Palpations: Abdomen is soft.      Tenderness: There is no abdominal tenderness.   Musculoskeletal:      Cervical back: No tenderness or bony tenderness.      Thoracic back: No tenderness or bony tenderness.      Lumbar back: No tenderness or bony tenderness.      Right lower leg: Edema present.      Left lower leg: Edema present.   Skin:     General: Skin is warm.   Neurological:      Mental Status: She is easily aroused. She is lethargic.      Motor: Weakness present.      Comments: No nuchal rigidity   Psychiatric:         Behavior: Behavior is cooperative.       Significant Labs: All pertinent labs within the past 24 hours have been reviewed.  Blood Culture:   Recent Labs   Lab 04/13/23  1810 04/13/23  1825   LABBLOO No Growth to date  No Growth to date     CBC:   Recent Labs   Lab 04/13/23  1826 04/14/23  0703   WBC 9.09 35.25*   HGB 13.7 12.6   HCT 41.9 38.0    262     CMP:   Recent Labs   Lab 04/13/23  1826 04/14/23  0703   * 137   K 4.0 3.8    105   CO2 21* 24   GLU 99 71   BUN 17 12   CREATININE 0.7 0.6   CALCIUM 8.5* 8.3*   PROT 6.1 5.7*   ALBUMIN 3.0* 2.7*   BILITOT 1.0 1.8*   ALKPHOS 345* 249*   * 231*   * 184*   ANIONGAP 10 8     Lactic Acid:   Recent Labs   Lab 04/13/23 1826 04/13/23  2152   LACTATE 2.6* 1.4     Troponin: No results for input(s): TROPONINI, TROPONINIHS in the last 48 hours.  Urine Studies:   Recent Labs   Lab 04/13/23  1832   COLORU Yellow   APPEARANCEUA Hazy*   PHUR 6.0   SPECGRAV 1.020   PROTEINUA Negative   GLUCUA Negative   KETONESU Negative   BILIRUBINUA Negative   OCCULTUA Negative   NITRITE Negative   UROBILINOGEN 2.0-3.0*   LEUKOCYTESUR 2+*   RBCUA 3   WBCUA 5   SQUAMEPITHEL 8       Significant Imaging: I have reviewed all pertinent imaging results/findings within the past 24 hours.  CT: I have reviewed all pertinent results/findings within the past 24 hours and my personal findings are:  periportal edema and atelectasis  CXR: I have reviewed all pertinent results/findings within the past 24 hours and my personal findings are:  no acute abnormality  Echo: I have reviewed all pertinent results/findings within the past 24 hours and my personal findings are:  pending  Mri pending      Assessment/Plan:      Sepsis  This patient does have evidence of infective focus  My overall impression is sepsis.  Source: Abdominal and Unknown  Antibiotics given-   Antibiotics (72h ago, onward)    Start     Stop Route Frequency Ordered    04/14/23 1245  ceFEPIme (MAXIPIME) 2 g in dextrose 5 % in water (D5W) 5 % 50 mL IVPB (MB+)         -- IV Every 8 hours (non-standard times) 04/14/23 1132    04/14/23 0930  vancomycin 750 mg in dextrose 5 % (D5W) 250 mL IVPB (Vial-Mate)         -- IV Every 12 hours  (non-standard times) 04/13/23 2136 04/13/23 2009  vancomycin - pharmacy to dose  (vancomycin IVPB)        See [facility] for full Linked Orders Report.    -- IV pharmacy to manage frequency 04/13/23 1910        Latest lactate reviewed-  Recent Labs   Lab 04/13/23  1826 04/13/23  2152   LACTATE 2.6* 1.4     Organ dysfunction indicated by Acute liver injury and Encephalopathy    Fluid challenge Ideal Body Weight- The patient's ideal body weight is Ideal body weight: 50.1 kg (110 lb 7.2 oz) which will be used to calculate fluid bolus of 30 ml/kg for treatment of septic shock.      Post- resuscitation assessment Yes Perfusion exam was performed within 6 hours of septic shock presentation after bolus shows Adequate tissue perfusion assessed by non-invasive monitoring       Will Not start Pressors- Levophed for MAP of 65  Source control achieved by: intravenous antibiotic(s)   Consider lumbar puncture     Leukocytosis  Source control  Unknown at this time  ddx meningitis, osteo, ectopic pregnancy, G.I, endocarditis  Continue intravenous antibiotic(s)  Follow up imaging       Lower abdominal pain  CT abdomen/pelvis positive for trace free fluid in the dependent portions of pelvis but negative acute findings regarding  or GI system. Workup from ED visit at [facility] revealed negative gonorrhea/chlamydia, negative wet prep.Pelvic and transvaginal ultrasound positive for right ovarian complex heterogeneous follicle, heterogeneous right adnexal/posterior cul-de-sac fluid with clinical correlation to ectopic pregnancy.  Patient without dysuria, hematuria, abnormal vaginal bleeding/discharge, melena, hematochezia, or diarrhea.  Plan:  -continue current pain regimen, titrate as needed  -bowel regimen  -f/u OBGYN      4/14  nontender on exam  Mildly elevated tbili  Trace free fluid in pelvis on recent imaging  preg negative     Lumbar back pain  Patient presented with worsening lumbar back pain x3 weeks duration in setting of IV  drug abuse.  Patient afebrile without leukocytosis.  Sed rate within normal limits but CRP elevated measuring 15.4.  Patient also had lactic acidosis measuring 2.6.  Patient initiated on vancomycin given high risk for possible osteomyelitis and awaiting further imaging via MRI of T and L-spine as well as echo.  Plan:  -IVFs  -continue antibiotics  -f/u cultures  -continue current pain regimen, titrate as needed  -bowel regimen  -f/u MRI and echocardiogram    4/14  No bony tenderness on palpation  Images pending      Transaminitis  tbili elevated  ast and alt trending down  Likely hepatic congestion with periportal edema on ct    Iron deficiency anemia secondary to inadequate dietary iron intake  Appears near baseline without evidence of active bleeding noted.   Recent Labs   Lab 04/13/23  1826 04/14/23  0703   HGB 13.7 12.6   HCT 41.9 38.0   MCV 88 89   MCHC 32.7 33.2   RDW 12.7 13.2    262   Plan:  -Monitor H/H and plts  -Type and screen, transfuse as needed   -Continue home medications     Chronic hepatitis C without hepatic coma  Patient previously diagnosed with hepatitis C found to be antibody positive in ED. patient negative for hepatitis-B.  Patient also presents with abdominal pain and acute transaminitis with alk phosphatase measuring 345 and AST/ALT measuring 812/232.  CT abdomen/pelvis revealed fatty infiltration of liver with hepatomegaly, periportal edema but negative focal lesions. Patient denies abuse of Tylenol or recent alcohol consumption.   Plan:  -IVFs  -f/u qualitative hepatitis-C panel as high risk  -trend LFTs  -f/u tylenol level  Screen for hiv    History of drug abuse  Patient positive for benzos and amphetamines on tox screen.  Patient reports taking Xanax and Zoloft but denies use of any other illicit drugs with last reported heroin usage 1 year ago but told ED staff last use was 3 months ago.  Plan:  -continue to monitor for signs/symptoms of withdrawals, treat  accordingly  -continued infectious treatment noted above    4/14  ivdu last used one year ago  Incarcerated then relapsed with meth  Mri and echocardiogram pending      VTE Risk Mitigation (From admission, onward)         Ordered     enoxaparin injection 40 mg  Daily         04/14/23 0146     IP VTE LOW RISK PATIENT  Once         04/14/23 0146     Place sequential compression device  Until discontinued         04/14/23 0146                Discharge Planning   ADI:      Code Status: Full Code   Is the patient medically ready for discharge?:     Reason for patient still in hospital (select all that apply): Patient trending condition, Laboratory test, Treatment, Imaging and Consult recommendations  Discharge Plan A: Home with family                  Nando Anguiano MD  Department of Hospital Medicine   [facility] - Med Surg

## 2023-04-14 NOTE — HOSPITAL COURSE
"4/14 admitted for transaminitis, which is improving. Patient meeting criteria for sepsis. On vanc and cefepime. Source control. Unknown at this time. Pregnancy negative. Remains lethargic. States relapsing with using meth. Previously on suboxone and tried to wean off while incarcerated. Mri and echocardiogram pending. ivdu one year ago.    4/15  Sepsis workup negative thus far. Blood culture negative growth to date. Leukocytosis trending down. Patient found to be drinking and vaping in hospital despite counseling against. Patient received 3 days of intravenous cefepime and vanc. Afebrile x 48h. Leukocytosis trending down.     Transaminitis trending down and related to pmh chronic hep c from ivdu and hepatic congestion from periportal edema. Patient continues to insult liver with etoh use disorder while in the hospital. Not willing to quit despite attempts to .    After discussing with ob, with negative pregnancy test (4/15), unlikely to have an ectopic pregnancy. CT abdomen with normal reproductive organs. "Correlate clinically with ectopic pregnancy" from outside records with transvaginal us on 4/13.    Patient seen and evaluated by me. Patient was determined to be suitable for d/c. Patient deemed stable for discharge to home.    "

## 2023-04-14 NOTE — PROGRESS NOTES
Pharmacokinetic Initial Assessment: IV Vancomycin    Assessment/Plan:    Initiate intravenous vancomycin with loading dose of 1500 mg once followed by a maintenance dose of vancomycin 750 mg IV every 12 hours  Desired empiric serum trough concentration is 15 to 20 mcg/mL  Draw vancomycin trough level 60 min prior to fourth dose on 04/15 at approximately 0830  Pharmacy will continue to follow and monitor vancomycin.      Please contact pharmacy at extension [phone] with any questions regarding this assessment.     Thank you for the consult,   Mita Rojas       Patient brief summary:  Jaymie Wilhelm is a 33 y.o. female initiated on antimicrobial therapy with IV Vancomycin for treatment of suspected sepsis    Drug Allergies:   Review of patient's allergies indicates:   Allergen Reactions    Pneumococcal 23-yuridia ps vaccine      Patient refuses    Latex, natural rubber Rash    Tylenol-codeine #3 [acetaminophen-codeine] Hives       Actual Body Weight:   58.9 kg    Renal Function:   Estimated Creatinine Clearance: 92.1 mL/min (based on SCr of 0.7 mg/dL).,     Dialysis Method (if applicable):  N/A    CBC (last 72 hours):  Recent Labs   Lab Result Units 04/13/23  1826   WBC K/uL 9.09   Hemoglobin g/dL 13.7   Hematocrit % 41.9   Platelets K/uL 289   Gran % % 95.1*   Lymph % % 4.0*   Mono % % 0.2*   Eosinophil % % 0.1   Basophil % % 0.3   Differential Method  Automated       Metabolic Panel (last 72 hours):  Recent Labs   Lab Result Units 04/13/23  1826 04/13/23  1832   Sodium mmol/L 134*  --    Potassium mmol/L 4.0  --    Chloride mmol/L 103  --    CO2 mmol/L 21*  --    Glucose mg/dL 99  --    Glucose, UA   --  Negative   BUN mg/dL 17  --    Creatinine mg/dL 0.7  --    Albumin g/dL 3.0*  --    Total Bilirubin mg/dL 1.0  --    Alkaline Phosphatase U/L 345*  --    AST U/L 812*  --    ALT U/L 232*  --        Drug levels (last 3 results):  No results for input(s): VANCOMYCINRA, VANCORANDOM, VANCOMYCINPE, VANCOPEAK,  VANCOMYCINTR, VANCOTROUGH in the last 72 hours.    Microbiologic Results:  Microbiology Results (last 7 days)       Procedure Component Value Units Date/Time    Blood culture x two cultures. Draw prior to antibiotics. [[id number]] Collected: 04/13/23 1825    Order Status: Sent Specimen: Blood from Peripheral, Antecubital, Right Updated: 04/13/23 1826    Blood culture x two cultures. Draw prior to antibiotics. [[id number]] Collected: 04/13/23 1810    Order Status: Sent Specimen: Blood from Peripheral, Antecubital, Right Updated: 04/13/23 1826

## 2023-04-14 NOTE — ASSESSMENT & PLAN NOTE
Appears near baseline without evidence of active bleeding noted.   Recent Labs   Lab 04/13/23  1826   HGB 13.7   HCT 41.9   MCV 88   MCHC 32.7   RDW 12.7      Plan:  -Monitor H/H and plts  -Type and screen, transfuse as needed   -Continue home medications

## 2023-04-14 NOTE — ASSESSMENT & PLAN NOTE
Appears near baseline without evidence of active bleeding noted.   Recent Labs   Lab 04/13/23  1826 04/14/23  0703   HGB 13.7 12.6   HCT 41.9 38.0   MCV 88 89   MCHC 32.7 33.2   RDW 12.7 13.2    262   Plan:  -Monitor H/H and plts  -Type and screen, transfuse as needed   -Continue home medications

## 2023-04-14 NOTE — ASSESSMENT & PLAN NOTE
This patient does have evidence of infective focus  My overall impression is sepsis.  Source: Abdominal and Unknown  Antibiotics given-   Antibiotics (72h ago, onward)    Start     Stop Route Frequency Ordered    04/14/23 1245  ceFEPIme (MAXIPIME) 2 g in dextrose 5 % in water (D5W) 5 % 50 mL IVPB (MB+)         -- IV Every 8 hours (non-standard times) 04/14/23 1132    04/14/23 0930  vancomycin 750 mg in dextrose 5 % (D5W) 250 mL IVPB (Vial-Mate)         -- IV Every 12 hours (non-standard times) 04/13/23 2136    04/13/23 2009  vancomycin - pharmacy to dose  (vancomycin IVPB)        [facility].    -- IV pharmacy to manage frequency 04/13/23 1910        Latest lactate reviewed-  Recent Labs   Lab 04/13/23  1826 04/13/23  2152   LACTATE 2.6* 1.4     Organ dysfunction indicated by Acute liver injury and Encephalopathy    Fluid challenge Ideal Body Weight- The patient's ideal body weight is Ideal body weight: 50.1 kg (110 lb 7.2 oz) which will be used to calculate fluid bolus of 30 ml/kg for treatment of septic shock.      Post- resuscitation assessment Yes Perfusion exam was performed within 6 hours of septic shock presentation after bolus shows Adequate tissue perfusion assessed by non-invasive monitoring       Will Not start Pressors- Levophed for MAP of 65  Source control achieved by: intravenous antibiotic(s)   Consider lumbar puncture

## 2023-04-14 NOTE — ASSESSMENT & PLAN NOTE
Patient previously diagnosed with hepatitis C found to be antibody positive in ED. patient negative for hepatitis-B.  Patient also presents with abdominal pain and acute transaminitis with alk phosphatase measuring 345 and AST/ALT measuring 812/232.  CT abdomen/pelvis revealed fatty infiltration of liver with hepatomegaly, periportal edema but negative focal lesions. Patient denies abuse of Tylenol or recent alcohol consumption.   Plan:  -IVFs  -f/u qualitative hepatitis-C panel  -trend LFTs  -f/u tylenol level

## 2023-04-14 NOTE — ASSESSMENT & PLAN NOTE
Patient presented with worsening lumbar back pain x3 weeks duration in setting of IV drug abuse.  Patient afebrile without leukocytosis.  Sed rate within normal limits but CRP elevated measuring 15.4.  Patient also had lactic acidosis measuring 2.6.  Patient initiated on vancomycin given high risk for possible osteomyelitis and awaiting further imaging via MRI of T and L-spine as well as echo.  Plan:  -IVFs  -continue antibiotics  -f/u cultures  -continue current pain regimen, titrate as needed  -bowel regimen  -f/u MRI and echo

## 2023-04-14 NOTE — PROGRESS NOTES
Pharmacist Renal Dose Adjustment Note    Jaymie Wilhelm is a 33 y.o. female being treated with the medication Cefepime    Patient Data:    Vital Signs (Most Recent):  Temp: (!) 100.6 °F (38.1 °C) (04/13/23 1746)  Pulse: 110 (04/13/23 1746)  Resp: 18 (04/13/23 1705)  BP: 99/65 (04/13/23 1746)  SpO2: 95 % (04/13/23 1746)   Vital Signs (72h Range):  Temp:  [98.6 °F (37 °C)-100.6 °F (38.1 °C)]   Pulse:  [110-113]   Resp:  [18]   BP: ()/(55-65)   SpO2:  [95 %-96 %]      Recent Labs   Lab 04/13/23 1826   CREATININE 0.7     Serum creatinine: 0.7 mg/dL 04/13/23 1826  Estimated creatinine clearance: 92.1 mL/min    Medication: Cefepime 1 g was changed to cefepime 2 g per protocol / sepsis indication    Pharmacist's Name: Eliot Blake  Pharmacist's Extension: [phone]

## 2023-04-14 NOTE — ASSESSMENT & PLAN NOTE
Patient positive for benzos and amphetamines on tox screen.  Patient reports taking Xanax and Zoloft but denies use of any other illicit drugs with last reported heroin usage 1 year ago but told ED staff last use was 3 months ago.  Plan:  -continue to monitor for signs/symptoms of withdrawals, treat accordingly  -continued infectious treatment noted above    4/14  ivdu last used one year ago  Incarcerated then relapsed with meth  Mri and echocardiogram pending
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
## 2023-04-14 NOTE — PROGRESS NOTES
Pt requested an appt with the smoking cessation program. Denies need for nicotine patches at this time.

## 2023-04-14 NOTE — ED NOTES
"Pt wants to leave out to go see her kids and states " they cannot come in because they may get sick" I instructed the pt that I cannot let her leave out due to her admission and she has iv access points; Nash (charge nurse) is in the room currently speaking to the pt  "

## 2023-04-14 NOTE — PLAN OF CARE
[facility] - Med Surg  Initial Discharge Assessment       Primary Care Provider: Primary Doctor No    Admission Diagnosis: Sepsis [A41.9]    Admission Date: 4/13/2023  Expected Discharge Date: per attending         Payor: MEDICAID / Plan: [facility] ([employer]) / Product Type: Managed Medicaid /     Extended Emergency Contact Information  Primary Emergency Contact: Johnson Garcia   [place]  Mobile Phone: [phone]  Relation: Spouse  Preferred language: English   needed? No    Discharge Plan A: Home with family         [id number] - [place] - [address] AT [facility] [address]  [address]  [place] [id number]  Phone: [phone] Fax: [phone]      Initial Assessment (most recent)       Adult Discharge Assessment - 04/14/23 0924          Discharge Assessment    Assessment Type Discharge Planning Assessment     Confirmed/corrected address, phone number and insurance Yes     Confirmed Demographics Correct on Facesheet     Source of Information other (see comments)   spouse    When was your last doctors appointment? --   few nonths    Communicated ADI with patient/caregiver Date not available/Unable to determine     Reason For Admission self referral     People in Home spouse     Do you expect to return to your current living situation? Yes     Do you have help at home or someone to help you manage your care at home? Yes     Who are your caregiver(s) and their phone number(s)? spouse     Prior to hospitilization cognitive status: Alert/Oriented     Current cognitive status: Alert/Oriented     Equipment Currently Used at Home none     Readmission within 30 days? No     Patient currently being followed by outpatient case management? No     Do you currently have service(s) that help you manage your care at home? No     Do you take prescription medications? Yes     Do you have prescription coverage? Yes     Coverage medicaid     Do you  have any problems affording any of your prescribed medications? No     Is the patient taking medications as prescribed? yes     Who is going to help you get home at discharge? spouse     How do you get to doctors appointments? family or friend will provide     Are you on dialysis? No     Do you take coumadin? No     Discharge Plan A Home with family                   SW to f/u as needed.

## 2023-04-14 NOTE — SUBJECTIVE & OBJECTIVE
Interval History: See hospital course for today      Review of Systems   Unable to perform ROS: Acuity of condition   Objective:     Vital Signs (Most Recent):  Temp: 97.8 °F (36.6 °C) (04/14/23 1203)  Pulse: 71 (04/14/23 1203)  Resp: 16 (04/14/23 1221)  BP: (!) 103/57 (04/14/23 1203)  SpO2: 100 % (04/14/23 1203)   Vital Signs (24h Range):  Temp:  [97 °F (36.1 °C)-100.6 °F (38.1 °C)] 97.8 °F (36.6 °C)  Pulse:  [] 71  Resp:  [14-18] 16  SpO2:  [95 %-100 %] 100 %  BP: ()/(55-72) 103/57     Weight: 57.6 kg (126 lb 15.8 oz)  Body mass index is 23.23 kg/m².  No intake or output data in the 24 hours ending 04/14/23 1231   Physical Exam  Vitals and nursing note reviewed.   Constitutional:       General: She is sleeping. She is not in acute distress.     Appearance: She is ill-appearing. She is not toxic-appearing.   HENT:      Head: Normocephalic and atraumatic.   Neck:      Meningeal: Brudzinski's sign absent.   Cardiovascular:      Rate and Rhythm: Normal rate.   Pulmonary:      Effort: Pulmonary effort is normal. No respiratory distress.   Abdominal:      Palpations: Abdomen is soft.      Tenderness: There is no abdominal tenderness.   Musculoskeletal:      Cervical back: No tenderness or bony tenderness.      Thoracic back: No tenderness or bony tenderness.      Lumbar back: No tenderness or bony tenderness.      Right lower leg: Edema present.      Left lower leg: Edema present.   Skin:     General: Skin is warm.   Neurological:      Mental Status: She is easily aroused. She is lethargic.      Motor: Weakness present.      Comments: No nuchal rigidity   Psychiatric:         Behavior: Behavior is cooperative.       Significant Labs: All pertinent labs within the past 24 hours have been reviewed.  Blood Culture:   Recent Labs   Lab 04/13/23  1810 04/13/23  1825   LABBLOO No Growth to date No Growth to date     CBC:   Recent Labs   Lab 04/13/23  1826 04/14/23  0703   WBC 9.09 35.25*   HGB 13.7 12.6   HCT 41.9  38.0    262     CMP:   Recent Labs   Lab 04/13/23  1826 04/14/23  0703   * 137   K 4.0 3.8    105   CO2 21* 24   GLU 99 71   BUN 17 12   CREATININE 0.7 0.6   CALCIUM 8.5* 8.3*   PROT 6.1 5.7*   ALBUMIN 3.0* 2.7*   BILITOT 1.0 1.8*   ALKPHOS 345* 249*   * 231*   * 184*   ANIONGAP 10 8     Lactic Acid:   Recent Labs   Lab 04/13/23  1826 04/13/23  2152   LACTATE 2.6* 1.4     Troponin: No results for input(s): TROPONINI, TROPONINIHS in the last 48 hours.  Urine Studies:   Recent Labs   Lab 04/13/23  1832   COLORU Yellow   APPEARANCEUA Hazy*   PHUR 6.0   SPECGRAV 1.020   PROTEINUA Negative   GLUCUA Negative   KETONESU Negative   BILIRUBINUA Negative   OCCULTUA Negative   NITRITE Negative   UROBILINOGEN 2.0-3.0*   LEUKOCYTESUR 2+*   RBCUA 3   WBCUA 5   SQUAMEPITHEL 8       Significant Imaging: I have reviewed all pertinent imaging results/findings within the past 24 hours.  CT: I have reviewed all pertinent results/findings within the past 24 hours and my personal findings are:  periportal edema and atelectasis  CXR: I have reviewed all pertinent results/findings within the past 24 hours and my personal findings are:  no acute abnormality  Echo: I have reviewed all pertinent results/findings within the past 24 hours and my personal findings are:  pending  Mri pending

## 2023-04-14 NOTE — ASSESSMENT & PLAN NOTE
Patient positive for benzos and amphetamines on tox screen.  Patient reports taking Xanax and Zoloft but denies use of any other illicit drugs with last reported heroin usage 1 year ago but told ED staff last use was 3 months ago.  Plan:  -continue to monitor for signs/symptoms of withdrawals, treat accordingly  -continued infectious treatment noted above

## 2023-04-14 NOTE — ASSESSMENT & PLAN NOTE
CT abdomen/pelvis positive for trace free fluid in the dependent portions of pelvis but negative acute findings regarding  or GI system. Workup from ED visit at [facility] revealed negative gonorrhea/chlamydia, negative wet prep.Pelvic and transvaginal ultrasound positive for right ovarian complex heterogeneous follicle, heterogeneous right adnexal/posterior cul-de-sac fluid with clinical correlation to ectopic pregnancy.  Patient without dysuria, hematuria, abnormal vaginal bleeding/discharge, melena, hematochezia, or diarrhea.  Plan:  -continue current pain regimen, titrate as needed  -bowel regimen  -f/u OBGYN

## 2023-04-14 NOTE — ASSESSMENT & PLAN NOTE
Patient previously diagnosed with hepatitis C found to be antibody positive in ED. patient negative for hepatitis-B.  Patient also presents with abdominal pain and acute transaminitis with alk phosphatase measuring 345 and AST/ALT measuring 812/232.  CT abdomen/pelvis revealed fatty infiltration of liver with hepatomegaly, periportal edema but negative focal lesions. Patient denies abuse of Tylenol or recent alcohol consumption.   Plan:  -IVFs  -f/u qualitative hepatitis-C panel as high risk  -trend LFTs  -f/u tylenol level  Screen for hiv

## 2023-04-14 NOTE — HPI
Jaymie Wilhelm is a 33 y.o. female with a PMH  has a past medical history of Anxiety disorder, Chronic hepatitis C without hepatic coma (12/31/2018), Chronic hypertension (2/12/2019), Drug abuse, First degree perineal laceration during delivery (1/22/2015), Gestational diabetes, Hepatitis C (2016), Iron deficiency anemia secondary to inadequate dietary iron intake (11/5/2019), Seizures, and Thyroid disease. who presented to the ED complaining of worsening generalized abdominal pain worsened her lower quadrant with radiation to her back x3 weeks duration.  Patient was seen at [facility] last night for similar complaints and was discharged home with no clear indication of etiology of symptoms.  Patient currently endorsing lower abdominal pain described as hurting in nature, constant, currently rated 6/10 in severity with no known alleviating or aggravating factors noted.  Patient also complaining of lower back pain with left paraspinal greater than right paraspinal greater than midline described as sharp/stabbing in nature, constant, currently rated 10/10 in severity with radiation down bilateral lower extremities with left greater than right.  Patient reported aggravating factors included weight-bearing, movement, and palpation to the affected area with no known alleviating factors.  Associated symptoms included right lower extremity numbness but denied endorsing any bowel/bladder incontinence.  Patient also reported endorsing flu-like symptoms over the past week again with no known alleviating or aggravating factors.  Symptoms included subjective fevers with T-max measuring 100.6 F, generalized malaise, fatigue, but denied endorsing any lightheadedness, dizziness, headaches, visual changes, chills, sweats, nausea, vomiting, rhinorrhea, congestion, cough, phlegm production, chest pain, shortness of breath, dysuria, hematuria, melena, hematochezia, or diarrhea, and reported all other review of systems negative except  as noted above.  Patient does report history of IV drug abuse and injected heroin aproximal 3 months ago.  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  Patient also contracted hepatitis-C and again did not receive treatment.  Workup from ED visit at [facility] revealed negative gonorrhea/chlamydia, negative wet prep, negative hepatitis-B, positive hepatitis C antibody, negative pregnancy, alk phosphatase 117, AST/ALT 81/70, pelvic and transvaginal ultrasound positive for right ovarian complex heterogeneous follicle, heterogeneous right adnexal /posterior cul-de-sac fluid with clinical correlation to ectopic pregnancy.  Repeat workup in the ED tonight showed worsening transaminitis with alk phosphatase now measuring 345, AST/ALT a 12/232 respectfully, lactic acid 2.6, CRP 15.4, but is without leukocytosis and normal sed rate.  Lipase within normal limits.  Patient denied abuse of Tylenol, consumption of mushrooms, or abuse of medications.  Prior to onset of symptoms, patient reported being in her usual state of health with no other concerns or complaints.  Patient being admitted to [facility] inpatient for further workup of transaminitis and osteomyelitis.    PCP: Primary Doctor No

## 2023-04-14 NOTE — ASSESSMENT & PLAN NOTE
CT abdomen/pelvis positive for trace free fluid in the dependent portions of pelvis but negative acute findings regarding  or GI system. Workup from ED visit at [facility] revealed negative gonorrhea/chlamydia, negative wet prep.Pelvic and transvaginal ultrasound positive for right ovarian complex heterogeneous follicle, heterogeneous right adnexal/posterior cul-de-sac fluid with clinical correlation to ectopic pregnancy.  Patient without dysuria, hematuria, abnormal vaginal bleeding/discharge, melena, hematochezia, or diarrhea.  Plan:  -continue current pain regimen, titrate as needed  -bowel regimen  -f/u OBGYN      4/14  nontender on exam  Mildly elevated tbili  Trace free fluid in pelvis on recent imaging  preg negative

## 2023-04-14 NOTE — ASSESSMENT & PLAN NOTE
Patient presented with worsening lumbar back pain x3 weeks duration in setting of IV drug abuse.  Patient afebrile without leukocytosis.  Sed rate within normal limits but CRP elevated measuring 15.4.  Patient also had lactic acidosis measuring 2.6.  Patient initiated on vancomycin given high risk for possible osteomyelitis and awaiting further imaging via MRI of T and L-spine as well as echo.  Plan:  -IVFs  -continue antibiotics  -f/u cultures  -continue current pain regimen, titrate as needed  -bowel regimen  -f/u MRI and echocardiogram    4/14  No bony tenderness on palpation  Images pending

## 2023-04-14 NOTE — ASSESSMENT & PLAN NOTE
Source control  Unknown at this time  ddx meningitis, osteo, ectopic pregnancy, G.I, endocarditis  Continue intravenous antibiotic(s)  Follow up imaging

## 2023-04-15 VITALS
DIASTOLIC BLOOD PRESSURE: 87 MMHG | RESPIRATION RATE: 12 BRPM | TEMPERATURE: 98 F | HEART RATE: 61 BPM | BODY MASS INDEX: 26.82 KG/M2 | HEIGHT: 62 IN | WEIGHT: 145.75 LBS | SYSTOLIC BLOOD PRESSURE: 136 MMHG | OXYGEN SATURATION: 93 %

## 2023-04-15 PROBLEM — A41.9 SEPSIS: Status: RESOLVED | Noted: 2023-04-14 | Resolved: 2023-04-15

## 2023-04-15 PROBLEM — R10.30 LOWER ABDOMINAL PAIN: Status: RESOLVED | Noted: 2023-04-14 | Resolved: 2023-04-15

## 2023-04-15 LAB
ALBUMIN SERPL BCP-MCNC: 2.6 G/DL (ref 3.5–5.2)
ALP SERPL-CCNC: 185 U/L (ref 55–135)
ALT SERPL W/O P-5'-P-CCNC: 116 U/L (ref 10–44)
ANION GAP SERPL CALC-SCNC: 7 MMOL/L (ref 8–16)
AST SERPL-CCNC: 83 U/L (ref 10–40)
BASOPHILS # BLD AUTO: 0.08 K/UL (ref 0–0.2)
BASOPHILS NFR BLD: 0.4 % (ref 0–1.9)
BILIRUB SERPL-MCNC: 0.2 MG/DL (ref 0.1–1)
BUN SERPL-MCNC: 11 MG/DL (ref 6–20)
CALCIUM SERPL-MCNC: 8.4 MG/DL (ref 8.7–10.5)
CHLORIDE SERPL-SCNC: 110 MMOL/L (ref 95–110)
CO2 SERPL-SCNC: 25 MMOL/L (ref 23–29)
CREAT SERPL-MCNC: 0.6 MG/DL (ref 0.5–1.4)
DIFFERENTIAL METHOD: ABNORMAL
EOSINOPHIL # BLD AUTO: 0.3 K/UL (ref 0–0.5)
EOSINOPHIL NFR BLD: 1.4 % (ref 0–8)
ERYTHROCYTE [DISTWIDTH] IN BLOOD BY AUTOMATED COUNT: 13.2 % (ref 11.5–14.5)
EST. GFR  (NO RACE VARIABLE): >60 ML/MIN/1.73 M^2
ETHANOL SERPL-MCNC: 146 MG/DL
GLUCOSE SERPL-MCNC: 80 MG/DL (ref 70–110)
HCT VFR BLD AUTO: 36.2 % (ref 37–48.5)
HGB BLD-MCNC: 11.8 G/DL (ref 12–16)
IMM GRANULOCYTES # BLD AUTO: 0.08 K/UL (ref 0–0.04)
IMM GRANULOCYTES NFR BLD AUTO: 0.4 % (ref 0–0.5)
LYMPHOCYTES # BLD AUTO: 3.7 K/UL (ref 1–4.8)
LYMPHOCYTES NFR BLD: 19.8 % (ref 18–48)
MCH RBC QN AUTO: 28.7 PG (ref 27–31)
MCHC RBC AUTO-ENTMCNC: 32.6 G/DL (ref 32–36)
MCV RBC AUTO: 88 FL (ref 82–98)
MONOCYTES # BLD AUTO: 1 K/UL (ref 0.3–1)
MONOCYTES NFR BLD: 5.2 % (ref 4–15)
NEUTROPHILS # BLD AUTO: 13.7 K/UL (ref 1.8–7.7)
NEUTROPHILS NFR BLD: 72.8 % (ref 38–73)
NRBC BLD-RTO: 0 /100 WBC
PLATELET # BLD AUTO: 252 K/UL (ref 150–450)
PMV BLD AUTO: 10.3 FL (ref 9.2–12.9)
POTASSIUM SERPL-SCNC: 3.5 MMOL/L (ref 3.5–5.1)
PROT SERPL-MCNC: 5.7 G/DL (ref 6–8.4)
RBC # BLD AUTO: 4.11 M/UL (ref 4–5.4)
SODIUM SERPL-SCNC: 142 MMOL/L (ref 136–145)
WBC # BLD AUTO: 18.78 K/UL (ref 3.9–12.7)

## 2023-04-15 PROCEDURE — 25000003 PHARM REV CODE 250: Performed by: FAMILY MEDICINE

## 2023-04-15 PROCEDURE — 82077 ASSAY SPEC XCP UR&BREATH IA: CPT | Performed by: HOSPITALIST

## 2023-04-15 PROCEDURE — 36415 COLL VENOUS BLD VENIPUNCTURE: CPT | Performed by: HOSPITALIST

## 2023-04-15 PROCEDURE — 63600175 PHARM REV CODE 636 W HCPCS: Performed by: FAMILY MEDICINE

## 2023-04-15 PROCEDURE — 85025 COMPLETE CBC W/AUTO DIFF WBC: CPT | Performed by: HOSPITALIST

## 2023-04-15 PROCEDURE — 99900035 HC TECH TIME PER 15 MIN (STAT)

## 2023-04-15 PROCEDURE — 80053 COMPREHEN METABOLIC PANEL: CPT | Performed by: HOSPITALIST

## 2023-04-15 RX ADMIN — CEFEPIME 2 G: 2 INJECTION, POWDER, FOR SOLUTION INTRAVENOUS at 04:04

## 2023-04-15 NOTE — DISCHARGE INSTRUCTIONS
Please call your primary care provider to schedule a hospital follow up appointment in 3-5 days to monitor your symptoms.

## 2023-04-15 NOTE — PLAN OF CARE
[facility] - Med Surg  Discharge Final Note    Primary Care Provider: Primary Doctor No    Expected Discharge Date: 4/15/2023    Final Discharge Note (most recent)       Final Note - 04/15/23 0915          Final Note    Assessment Type Final Discharge Note     Anticipated Discharge Disposition Home or Self Care        Post-Acute Status    Discharge Delays None known at this time                     Important Message from Medicare             Pt has no discharge needs at the time of chart review.

## 2023-04-15 NOTE — PLAN OF CARE
Pt remains free of injury throughout the shift, safety measures remain in place.   Poc reviewed with pt. Vss, no acute s/s of distress. Medication given as ordered.  Hourly rounding done. Chart reviwed, will cont with poc.        Problem: Adjustment to Illness (Sepsis/Septic Shock)  Goal: Optimal Coping  Outcome: Ongoing, Progressing     Problem: Infection Progression (Sepsis/Septic Shock)  Goal: Absence of Infection Signs and Symptoms  Outcome: Ongoing, Progressing     Problem: Adult Inpatient Plan of Care  Goal: Optimal Comfort and Wellbeing  Outcome: Ongoing, Progressing

## 2023-04-15 NOTE — DISCHARGE SUMMARY
[facility] Medicine  Discharge Summary      Patient Name: Jaymie Wilhelm  MRN: [id number]  BIGG: [id number]  Patient Class: IP- Inpatient  Admission Date: 4/13/2023  Hospital Length of Stay: 2 days  Discharge Date and Time:  04/15/2023 9:33 AM  Attending Physician: Nando Anguiano MD   Discharging Provider: Nando Anguiano MD  Primary Care Provider: Primary Doctor Alejandra    Primary Care Team: Networked reference to record PCT     HPI:   Jaymie Wilhelm is a 33 y.o. female with a PMH  has a past medical history of Anxiety disorder, Chronic hepatitis C without hepatic coma (12/31/2018), Chronic hypertension (2/12/2019), Drug abuse, First degree perineal laceration during delivery (1/22/2015), Gestational diabetes, Hepatitis C (2016), Iron deficiency anemia secondary to inadequate dietary iron intake (11/5/2019), Seizures, and Thyroid disease. who presented to the ED complaining of worsening generalized abdominal pain worsened her lower quadrant with radiation to her back x3 weeks duration.  Patient was seen at [facility] last night for similar complaints and was discharged home with no clear indication of etiology of symptoms.  Patient currently endorsing lower abdominal pain described as hurting in nature, constant, currently rated 6/10 in severity with no known alleviating or aggravating factors noted.  Patient also complaining of lower back pain with left paraspinal greater than right paraspinal greater than midline described as sharp/stabbing in nature, constant, currently rated 10/10 in severity with radiation down bilateral lower extremities with left greater than right.  Patient reported aggravating factors included weight-bearing, movement, and palpation to the affected area with no known alleviating factors.  Associated symptoms included right lower extremity numbness but denied endorsing any bowel/bladder incontinence.  Patient also reported endorsing flu-like symptoms over the past week again  with no known alleviating or aggravating factors.  Symptoms included subjective fevers with T-max measuring 100.6 F, generalized malaise, fatigue, but denied endorsing any lightheadedness, dizziness, headaches, visual changes, chills, sweats, nausea, vomiting, rhinorrhea, congestion, cough, phlegm production, chest pain, shortness of breath, dysuria, hematuria, melena, hematochezia, or diarrhea, and reported all other review of systems negative except as noted above.  Patient does report history of IV drug abuse and injected heroin aproximal 3 months ago.  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  Patient also contracted hepatitis-C and again did not receive treatment.  Workup from ED visit at [facility] revealed negative gonorrhea/chlamydia, negative wet prep, negative hepatitis-B, positive hepatitis C antibody, negative pregnancy, alk phosphatase 117, AST/ALT 81/70, pelvic and transvaginal ultrasound positive for right ovarian complex heterogeneous follicle, heterogeneous right adnexal /posterior cul-de-sac fluid with clinical correlation to ectopic pregnancy.  Repeat workup in the ED tonight showed worsening transaminitis with alk phosphatase now measuring 345, AST/ALT a 12/232 respectfully, lactic acid 2.6, CRP 15.4, but is without leukocytosis and normal sed rate.  Lipase within normal limits.  Patient denied abuse of Tylenol, consumption of mushrooms, or abuse of medications.  Prior to onset of symptoms, patient reported being in her usual state of health with no other concerns or complaints.  Patient being admitted to Hospital Medicine inpatient for further workup of transaminitis and osteomyelitis.    PCP: Primary Doctor No        * No surgery found *      Hospital Course:   4/14 admitted for transaminitis, which is improving. Patient meeting criteria for sepsis. On vanc and cefepime.  "Source control. Unknown at this time. Pregnancy negative. Remains lethargic. States relapsing with using meth. Previously on suboxone and tried to wean off while incarcerated. Mri and echocardiogram pending. ivdu one year ago.    4/15  Sepsis workup negative thus far. Blood culture negative growth to date. Leukocytosis trending down. Patient found to be drinking and vaping in hospital despite counseling against. Patient received 3 days of intravenous cefepime and vanc. Afebrile x 48h. Leukocytosis trending down.     Transaminitis trending down and related to pmh chronic hep c from ivdu and hepatic congestion from periportal edema. Patient continues to insult liver with etoh use disorder while in the hospital. Not willing to quit despite attempts to .    After discussing with ob, with negative pregnancy test (4/15), unlikely to have an ectopic pregnancy. CT abdomen with normal reproductive organs. "Correlate clinically with ectopic pregnancy" from outside records with transvaginal us on 4/13.    Patient seen and evaluated by me. Patient was determined to be suitable for d/c. Patient deemed stable for discharge to home.         Goals of Care Treatment Preferences:  Code Status: Full Code      Consults:     No new Assessment & Plan notes have been filed under this hospital service since the last note was generated.  Service: Hospital Medicine    Final Active Diagnoses:    Diagnosis Date Noted POA    PRINCIPAL PROBLEM:  Transaminitis [R74.01] 04/14/2023 Yes    Lumbar back pain [M54.50] 04/14/2023 Yes    Leukocytosis [D72.829] 04/14/2023 No    Iron deficiency anemia secondary to inadequate dietary iron intake [D50.8] 11/05/2019 Yes    Chronic hepatitis C without hepatic coma [B18.2] 12/31/2018 Yes    History of drug abuse [F19.11] 06/30/2014 Yes      Problems Resolved During this Admission:    Diagnosis Date Noted Date Resolved POA    Lower abdominal pain [R10.30] 04/14/2023 04/15/2023 Yes    Sepsis " [A41.9] 04/14/2023 04/15/2023 Yes       Discharged Condition: stable    Disposition:     Follow Up:   Follow-up Information     Your Primary Care Provider. Schedule an appointment as soon as possible for a visit in 3 day(s).    Why: hospital follow up                     Patient Instructions:      Diet Cardiac     Activity as tolerated       Significant Diagnostic Studies: Labs:   CMP   Recent Labs   Lab 04/13/23 1826 04/14/23  0703 04/15/23  0650   * 137 142   K 4.0 3.8 3.5    105 110   CO2 21* 24 25   GLU 99 71 80   BUN 17 12 11   CREATININE 0.7 0.6 0.6   CALCIUM 8.5* 8.3* 8.4*   PROT 6.1 5.7* 5.7*   ALBUMIN 3.0* 2.7* 2.6*   BILITOT 1.0 1.8* 0.2   ALKPHOS 345* 249* 185*   * 231* 83*   * 184* 116*   ANIONGAP 10 8 7*   , CBC   Recent Labs   Lab 04/13/23 1826 04/14/23  0703 04/15/23  0650   WBC 9.09 35.25* 18.78*   HGB 13.7 12.6 11.8*   HCT 41.9 38.0 36.2*    262 252   , Troponin No results for input(s): TROPONINI in the last 168 hours. and All labs within the past 24 hours have been reviewed  Microbiology:   Blood Culture   Lab Results   Component Value Date    LABBLOO No Growth to date 04/13/2023    LABBLOO No Growth to date 04/13/2023     Radiology: X-Ray: CXR: portable  MRI: lumbar and thoracic with and wo contrast  CT scan: CT ABDOMEN PELVIS WITH CONTRAST:   Results for orders placed or performed during the hospital encounter of 04/13/23   CT Abdomen Pelvis With Contrast    Narrative    EXAMINATION:  CT ABDOMEN PELVIS WITH CONTRAST    CLINICAL HISTORY:  Abdominal abscess/infection suspected;    TECHNIQUE:  Low dose axial images, sagittal and coronal reformations were obtained from the lung bases to the pubic symphysis following the IV administration of 100 mL of Omnipaque 350.    COMPARISON:  None    FINDINGS:  Heart: Normal size as far as seen. No effusion as far as seen.    Lung Bases: Subsegmental atelectasis suspected    Liver: Fatty infiltration of liver.  Hepatomegaly.   Periportal edema.  No focal lesions.    Gallbladder: No calcified gallstones.    Bile Ducts: No dilatation.    Pancreas: No mass. No peripancreatic fat stranding.    Spleen: Normal.    Adrenals: Normal.    Kidneys/Ureters: Normal enhancement.  No mass or  hydroureteronephrosis.    Bladder: No wall thickening.    Reproductive organs: Normal.    GI Tract/Mesentery: No evidence of bowel obstruction or inflammation.  No evidence of acute appendicitis.    Peritoneal Space: Trace free fluid in the dependent portion of pelvis    Retroperitoneum: No significant adenopathy.    Abdominal wall: Normal.    Vasculature: No aneurysm.    Bones: No acute fracture. No suspicious lytic or sclerotic lesions.      Impression    Periportal edema.  No definite evidence for abscess.  Subsegmental atelectasis.    All CT scans at this facility use dose modulation, iterative reconstruction, and/or weight based dosing when appropriate to reduce radiation dose to as low as reasonably achievable.      Electronically signed by: Enmanuel Rao  Date:    04/13/2023  Time:    21:09     Cardiac Graphics: Echocardiogram:   Transthoracic echo (TTE) complete (Cupid Only):   Results for orders placed or performed during the hospital encounter of 04/13/23   Echo   Result Value Ref Range    BSA 1.59 m2    TDI SEPTAL 0.15 m/s    LV LATERAL E/E' RATIO 4.33 m/s    LV SEPTAL E/E' RATIO 6.07 m/s    LA WIDTH 3.30 cm    IVC diameter 1.35 cm    Left Ventricular Outflow Tract Mean Velocity 0.67 cm/s    Left Ventricular Outflow Tract Mean Gradient 2.10 mmHg    Pulmonary Valve Mean Velocity 0.59 m/s    TDI LATERAL 0.21 m/s    PV PEAK VELOCITY 0.84 cm/s    LVIDd 5.20 3.5 - 6.0 cm    IVS 0.80 0.6 - 1.1 cm    Posterior Wall 0.77 0.6 - 1.1 cm    LVIDs 3.44 2.1 - 4.0 cm    FS 34 28 - 44 %    LA volume 34.76 cm3    STJ 1.89 cm    LV mass 141.77 g    LA size 2.91 cm    TAPSE 2.00 cm    Left Ventricle Relative Wall Thickness 0.30 cm    AV mean gradient 3 mmHg    AV valve  area 2.41 cm2    AV Velocity Ratio 0.83     AV index (prosthetic) 0.79     E/A ratio 1.20     Mean e' 0.18 m/s    E wave deceleration time 148.59 msec    LVOT diameter 1.97 cm    LVOT area 3.0 cm2    LVOT peak denny 0.99 m/s    LVOT peak VTI 20.40 cm    Ao peak denny 1.19 m/s    Ao VTI 25.8 cm    LVOT stroke volume 62.15 cm3    AV peak gradient 6 mmHg    E/E' ratio 5.06 m/s    MV Peak E Denny 0.91 m/s    TR Max Denny 2.14 m/s    MV Peak A Denny 0.76 m/s    LV Systolic Volume 48.94 mL    LV Systolic Volume Index 31.0 mL/m2    LV Diastolic Volume 129.23 mL    LV Diastolic Volume Index 81.79 mL/m2    LA Volume Index 22.0 mL/m2    LV Mass Index 90 g/m2    RA Major Axis 4.22 cm    Left Atrium Minor Axis 4.10 cm    Left Atrium Major Axis 4.43 cm    Triscuspid Valve Regurgitation Peak Gradient 18 mmHg    Right Atrial Pressure (from IVC) 3 mmHg    EF 65 %    TV rest pulmonary artery pressure 21 mmHg    Narrative    · The left ventricle is normal in size with normal systolic function.  · Normal left ventricular diastolic function.  · The estimated PA systolic pressure is 21 mmHg.  · Normal right ventricular size with normal right ventricular systolic   function.  · Normal central venous pressure (3 mmHg).  · The estimated ejection fraction is 65%.          Pending Diagnostic Studies:     Procedure Component Value Units Date/Time    Drug screen panel, emergency [[id number]] Collected: 04/13/23 2258    Order Status: Sent Lab Status: In process Updated: 04/13/23 2259    Specimen: Urine, Clean Catch     Pregnancy, urine rapid [[id number]] Collected: 04/13/23 1858    Order Status: Sent Lab Status: In process Updated: 04/13/23 1858    Specimen: Urine, Clean Catch          Medications:  Reconciled Home Medications:      Medication List      CONTINUE taking these medications    amLODIPine 5 MG tablet  Commonly known as: NORVASC  Take 5 mg by mouth once daily.        STOP taking these medications    buprenorphine-naloxone 2-0.5 mg 2-0.5 mg  Subl  Commonly known as: SUBOXONE            Indwelling Lines/Drains at time of discharge:   Lines/Drains/Airways     None                 Time spent on the discharge of patient: 41 minutes         Nando Anguiano MD  Department of Hospital Medicine  [facility]

## 2023-04-15 NOTE — NURSING
"Primary nurse was notified by PCT that there was an empty Fireball bottle in the garbage. Upon assesment there was a 200ml empty bottle. Trash bag was removed from room per charge nurse. MD notified. New orders placed for ETOH blood level. All pain medication discontinued. Educated the pt on the risk of  drinking & smoking while in the hospital. Pt states, "the alcohol was for her visitors, & not sure why her alcohol level would be elevated." Will cont with POC.  "

## 2023-04-15 NOTE — SIGNIFICANT EVENT
A bottle of Fireball whiskey and a vape pen was confiscated from the pt's room throughout the night. Charge nurse was notified, and items were placed in a bag with a patient label attached. Hospital medicine was notified as well. Upon assessment, pt was lethargic and difficult to arouse. While awake, pt continuously drifted back asleep.

## 2023-04-15 NOTE — PLAN OF CARE
Discussed poc with pt, pt verbalized understanding.  Purposeful rounding every 2 hours.Vital signs stable upon discharge.  Orders acknowledged.Chart check complete.  Cardiac monitoring discontinued. IV removed. Blood glucose monitoring complete.  Fall precautions in place, remains injury free. Accurate I&Os.  Bed locked at lowest position. Care plan adequate for discharge. Education resolved and adequate for discharge.

## 2023-04-19 LAB
BACTERIA BLD CULT: NORMAL
BACTERIA BLD CULT: NORMAL

## 2023-04-24 ENCOUNTER — TELEPHONE (OUTPATIENT)
Dept: HEMATOLOGY/ONCOLOGY | Facility: CLINIC | Age: 33
End: 2023-04-24
Payer: MEDICAID

## 2023-04-24 ENCOUNTER — PATIENT MESSAGE (OUTPATIENT)
Dept: HEMATOLOGY/ONCOLOGY | Facility: CLINIC | Age: 33
End: 2023-04-24
Payer: MEDICAID

## 2023-04-24 NOTE — TELEPHONE ENCOUNTER
Nurse spoke with pt in regards to scheduling a f./u appt. Pt has been scheduled. Pt stated she will confirm appts on portal. Nurse verbalized understanding

## 2023-04-26 ENCOUNTER — TELEPHONE (OUTPATIENT)
Dept: SMOKING CESSATION | Facility: CLINIC | Age: 33
End: 2023-04-26
Payer: MEDICAID

## 2023-05-08 ENCOUNTER — TELEPHONE (OUTPATIENT)
Dept: HEMATOLOGY/ONCOLOGY | Facility: CLINIC | Age: 33
End: 2023-05-08
Payer: MEDICAID

## 2023-05-08 NOTE — TELEPHONE ENCOUNTER
N/a nurse left detailed vm for pt to call back in regards to rescheduling her appt due needing labs done prior to her appt. Nurse advised pt on vm to call back to reschedule

## 2023-05-10 ENCOUNTER — TELEPHONE (OUTPATIENT)
Dept: INFUSION THERAPY | Facility: HOSPITAL | Age: 33
End: 2023-05-10
Payer: MEDICAID

## 2023-05-16 ENCOUNTER — TELEPHONE (OUTPATIENT)
Dept: HEMATOLOGY/ONCOLOGY | Facility: CLINIC | Age: 33
End: 2023-05-16
Payer: MEDICAID

## 2023-05-16 NOTE — TELEPHONE ENCOUNTER
----- Message from Ebony Johnson sent at 2023  6:33 AM CDT -----  Contact: pt sent message via [facility]  Jaymie Wilhelm  Sent: Deonna May 14, 2023  4:39 PM  To: [facility]       Jaymie Wilhelm  MRN: [id number] : 1990  Entered: [phone]      Message    Appointment Request From: Jaymie Wilhelm    With Provider: Karin Heath NP [[facility] - HemOn[facility]]    Preferred Date Range: 5/15/2023 - 2023    Preferred Times: Any Time    Reason for visit: Missed appointment    Comments:  Go over my endoscopy results and why I have no drive

## 2023-05-22 ENCOUNTER — PATIENT MESSAGE (OUTPATIENT)
Dept: HEMATOLOGY/ONCOLOGY | Facility: CLINIC | Age: 33
End: 2023-05-22

## 2023-05-24 ENCOUNTER — TELEPHONE (OUTPATIENT)
Dept: HEMATOLOGY/ONCOLOGY | Facility: CLINIC | Age: 33
End: 2023-05-24
Payer: MEDICAID

## 2023-05-24 NOTE — TELEPHONE ENCOUNTER
----- Message from Yara Nevarez sent at 5/24/2023  7:12 AM CDT -----  Regarding: appt access  Contact: pt  Type:  Sooner Apoointment Request    Caller is requesting a sooner appointment.  Caller declined first available appointment listed below.  Caller will not accept being placed on the waitlist and is requesting a message be sent to doctor.  Name of Caller: Jaymie Choco  When is the first available appointment? No appt avail   Symptoms: follow up  Would the patient rather a call back or a response via [facility]?  [facility]  Best Call Back Number:[phone]  Additional Information:  Pt wants a virtual appt

## 2023-05-25 ENCOUNTER — TELEPHONE (OUTPATIENT)
Dept: HEMATOLOGY/ONCOLOGY | Facility: CLINIC | Age: 33
End: 2023-05-25
Payer: MEDICAID

## 2023-05-25 NOTE — TELEPHONE ENCOUNTER
Nurse spoke with pt in regards to rescheduling her appt due to needing labs prior to her appt.  Nurse was unable to reschedule. Message has been sent to [facility] hem/onc scheduling with pts preferred date and time of her appt. Verbalized understanding. Call ended well

## 2023-05-29 ENCOUNTER — TELEPHONE (OUTPATIENT)
Dept: HEMATOLOGY/ONCOLOGY | Facility: CLINIC | Age: 33
End: 2023-05-29
Payer: MEDICAID

## 2023-05-29 ENCOUNTER — PATIENT MESSAGE (OUTPATIENT)
Dept: HEMATOLOGY/ONCOLOGY | Facility: CLINIC | Age: 33
End: 2023-05-29
Payer: MEDICAID

## 2023-05-29 NOTE — TELEPHONE ENCOUNTER
----- Message from Ladonna Vega sent at 5/29/2023 12:14 PM CDT -----  .Type:  Patient Returning Call    Who Called:.Jaymie Wilhelm   Who Left Message for Patient:RUKHSANASTEVE  Does the patient know what this is regarding?:  Would the patient rather a call back or a response via [facility]? Call back  Best Call Back Number NOEL  Additional Information:

## 2023-05-29 NOTE — TELEPHONE ENCOUNTER
N/a nurse unable to lvm due to mail box being full.pt has missed multiple appointments. Nurse will send portal message for pt to call back when she is ready to reschedule

## 2023-05-29 NOTE — TELEPHONE ENCOUNTER
Nurse spoke with pt in regards to rescheduling missed appt. Nurse was able to get pt rescheduled. Pt verbalized understanding of new scheduled appt. Call ended well

## 2023-06-05 ENCOUNTER — TELEPHONE (OUTPATIENT)
Dept: HEMATOLOGY/ONCOLOGY | Facility: CLINIC | Age: 33
End: 2023-06-05
Payer: MEDICAID

## 2023-06-05 NOTE — TELEPHONE ENCOUNTER
Nurse spoke with pt in regards to scheduling for labs.p[t has been scheduled. Verbalized understanding of scheduled labs and f/u appt.

## 2023-06-06 ENCOUNTER — HOSPITAL ENCOUNTER (EMERGENCY)
Facility: HOSPITAL | Age: 33
Discharge: HOME | End: 2023-06-06
Attending: FAMILY MEDICINE
Payer: MEDICAID

## 2023-06-06 VITALS
OXYGEN SATURATION: 99 % | DIASTOLIC BLOOD PRESSURE: 89 MMHG | HEIGHT: 62 IN | SYSTOLIC BLOOD PRESSURE: 143 MMHG | BODY MASS INDEX: 26.65 KG/M2 | HEART RATE: 88 BPM | TEMPERATURE: 98 F | RESPIRATION RATE: 18 BRPM

## 2023-06-06 DIAGNOSIS — R10.2 PELVIC PAIN: Primary | ICD-10-CM

## 2023-06-06 LAB
ALBUMIN SERPL BCP-MCNC: 4.5 G/DL (ref 3.5–5.2)
ALP SERPL-CCNC: 71 U/L (ref 55–135)
ALT SERPL W/O P-5'-P-CCNC: 29 U/L (ref 10–44)
ANION GAP SERPL CALC-SCNC: 11 MMOL/L (ref 8–16)
AST SERPL-CCNC: 29 U/L (ref 10–40)
B-HCG UR QL: NEGATIVE
BACTERIA #/AREA URNS HPF: ABNORMAL /HPF
BASOPHILS # BLD AUTO: 0.07 K/UL (ref 0–0.2)
BASOPHILS NFR BLD: 0.7 % (ref 0–1.9)
BILIRUB SERPL-MCNC: 0.3 MG/DL (ref 0.1–1)
BILIRUB UR QL STRIP: NEGATIVE
BUN SERPL-MCNC: 15 MG/DL (ref 6–20)
CALCIUM SERPL-MCNC: 8.2 MG/DL (ref 8.7–10.5)
CHLORIDE SERPL-SCNC: 106 MMOL/L (ref 95–110)
CLARITY UR: CLEAR
CO2 SERPL-SCNC: 24 MMOL/L (ref 23–29)
COLOR UR: YELLOW
CREAT SERPL-MCNC: 0.7 MG/DL (ref 0.5–1.4)
DIFFERENTIAL METHOD: NORMAL
EOSINOPHIL # BLD AUTO: 0.2 K/UL (ref 0–0.5)
EOSINOPHIL NFR BLD: 2.3 % (ref 0–8)
ERYTHROCYTE [DISTWIDTH] IN BLOOD BY AUTOMATED COUNT: 12.1 % (ref 11.5–14.5)
EST. GFR  (NO RACE VARIABLE): >60 ML/MIN/1.73 M^2
GLUCOSE SERPL-MCNC: 94 MG/DL (ref 70–110)
GLUCOSE UR QL STRIP: NEGATIVE
HCT VFR BLD AUTO: 44.6 % (ref 37–48.5)
HGB BLD-MCNC: 15.2 G/DL (ref 12–16)
HGB UR QL STRIP: ABNORMAL
HYALINE CASTS #/AREA URNS LPF: 0 /LPF
IMM GRANULOCYTES # BLD AUTO: 0.02 K/UL (ref 0–0.04)
IMM GRANULOCYTES NFR BLD AUTO: 0.2 % (ref 0–0.5)
KETONES UR QL STRIP: ABNORMAL
LEUKOCYTE ESTERASE UR QL STRIP: NEGATIVE
LYMPHOCYTES # BLD AUTO: 2.8 K/UL (ref 1–4.8)
LYMPHOCYTES NFR BLD: 30 % (ref 18–48)
MCH RBC QN AUTO: 29.2 PG (ref 27–31)
MCHC RBC AUTO-ENTMCNC: 34.1 G/DL (ref 32–36)
MCV RBC AUTO: 86 FL (ref 82–98)
MICROSCOPIC COMMENT: ABNORMAL
MONOCYTES # BLD AUTO: 0.7 K/UL (ref 0.3–1)
MONOCYTES NFR BLD: 7.5 % (ref 4–15)
NEUTROPHILS # BLD AUTO: 5.6 K/UL (ref 1.8–7.7)
NEUTROPHILS NFR BLD: 59.3 % (ref 38–73)
NITRITE UR QL STRIP: NEGATIVE
NRBC BLD-RTO: 0 /100 WBC
PH UR STRIP: 6 [PH] (ref 5–8)
PLATELET # BLD AUTO: 290 K/UL (ref 150–450)
PMV BLD AUTO: 10.1 FL (ref 9.2–12.9)
POTASSIUM SERPL-SCNC: 3.7 MMOL/L (ref 3.5–5.1)
PROT SERPL-MCNC: 7.7 G/DL (ref 6–8.4)
PROT UR QL STRIP: ABNORMAL
RBC # BLD AUTO: 5.2 M/UL (ref 4–5.4)
RBC #/AREA URNS HPF: >100 /HPF (ref 0–4)
SODIUM SERPL-SCNC: 141 MMOL/L (ref 136–145)
SP GR UR STRIP: >1.03 (ref 1–1.03)
URN SPEC COLLECT METH UR: ABNORMAL
UROBILINOGEN UR STRIP-ACNC: >=8 EU/DL
WBC # BLD AUTO: 9.48 K/UL (ref 3.9–12.7)
WBC #/AREA URNS HPF: 0 /HPF (ref 0–5)

## 2023-06-06 PROCEDURE — 99284 EMERGENCY DEPT VISIT MOD MDM: CPT | Mod: 25

## 2023-06-06 PROCEDURE — 81025 URINE PREGNANCY TEST: CPT | Performed by: NURSE PRACTITIONER

## 2023-06-06 PROCEDURE — 81000 URINALYSIS NONAUTO W/SCOPE: CPT | Performed by: NURSE PRACTITIONER

## 2023-06-06 PROCEDURE — 25000003 PHARM REV CODE 250: Performed by: REGISTERED NURSE

## 2023-06-06 PROCEDURE — 85025 COMPLETE CBC W/AUTO DIFF WBC: CPT | Performed by: REGISTERED NURSE

## 2023-06-06 PROCEDURE — 80053 COMPREHEN METABOLIC PANEL: CPT | Performed by: REGISTERED NURSE

## 2023-06-06 RX ORDER — HYDROCODONE BITARTRATE AND ACETAMINOPHEN 10; 325 MG/1; MG/1
1 TABLET ORAL
Status: COMPLETED | OUTPATIENT
Start: 2023-06-06 | End: 2023-06-06

## 2023-06-06 RX ORDER — NAPROXEN 500 MG/1
500 TABLET ORAL 2 TIMES DAILY WITH MEALS
Qty: 10 TABLET | Refills: 0 | Status: SHIPPED | OUTPATIENT
Start: 2023-06-06 | End: 2023-06-11

## 2023-06-06 RX ADMIN — HYDROCODONE BITARTRATE AND ACETAMINOPHEN 1 TABLET: 10; 325 TABLET ORAL at 07:06

## 2023-06-06 NOTE — FIRST PROVIDER EVALUATION
Emergency Department TeleTriage Encounter Note      CHIEF COMPLAINT    Chief Complaint   Patient presents with    Pelvic Pain     Pt reports bilateral pelvic pain X2 hours.        VITAL SIGNS   Initial Vitals [06/06/23 1815]   BP Pulse Resp Temp SpO2   (!) 143/89 88 16 97.7 °F (36.5 °C) 99 %      MAP       --            ALLERGIES    Review of patient's allergies indicates:   Allergen Reactions    Pneumococcal 23-yuridia ps vaccine      Patient refuses    Latex, natural rubber Rash    Tylenol-codeine #3 [acetaminophen-codeine] Hives       PROVIDER TRIAGE NOTE  This is a teletriage evaluation of a 33 y.o. female presenting to the ED with c/o lower abdominal/pelvic pain that started approximately just PTA.  Denies abnormal bleeding or discharge. Limited physical exam via telehealth: The patient is awake, alert, answering questions appropriately and is not in respiratory distress.  As the Teletriage provider, I performed an initial assessment and ordered appropriate labs and imaging studies, if any, to facilitate the patient's care once placed in the ED. Once a room is available, care and a full evaluation will be completed by an alternate ED provider.  Any additional orders and the final disposition will be determined by that provider.  All imaging and labs will not be followed-up by the Teletriage Team, including myself.          ORDERS  Labs Reviewed   PREGNANCY TEST, URINE RAPID   URINALYSIS, REFLEX TO URINE CULTURE       ED Orders (720h ago, onward)      Start Ordered     Status Ordering Provider    06/06/23 1833 06/06/23 1832  Pregnancy, urine rapid  Once         Ordered MARK WALTER    06/06/23 1833 06/06/23 1832  Urinalysis, Reflex to Urine Culture Urine, Clean Catch  STAT         Ordered MARK WALTER              Virtual Visit Note: The provider triage portion of this emergency department evaluation and documentation was performed via [employer], a HIPAA-compliant telemedicine application, in concert with a  tele-presenter in the room. A face to face patient evaluation with one of my colleagues will occur once the patient is placed in an emergency department room.      DISCLAIMER: This note was prepared with [employer] voice recognition transcription software. Garbled syntax, mangled pronouns, and other bizarre constructions may be attributed to that software system.
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
## 2023-06-08 ENCOUNTER — TELEPHONE (OUTPATIENT)
Dept: HEMATOLOGY/ONCOLOGY | Facility: CLINIC | Age: 33
End: 2023-06-08
Payer: MEDICAID

## 2023-08-30 ENCOUNTER — TELEPHONE (OUTPATIENT)
Dept: OBSTETRICS AND GYNECOLOGY | Facility: CLINIC | Age: 33
End: 2023-08-30
Payer: MEDICAID

## 2023-08-30 NOTE — TELEPHONE ENCOUNTER
----- Message from Melody Dee MA sent at 8/29/2023  4:03 PM CDT -----  Contact: pt  Type:  Sooner Apoointment Request    Caller is requesting a sooner appointment.  Caller declined first available appointment listed below.  Caller will not accept being placed on the waitlist and is requesting a message be sent to doctor.  Name of Caller:pt   When is the first available appointment?sooner appt 11/20  Symptoms:std   Would the patient rather a call back or a response via [facility]? Call   Best Call Back Number:[phone]    Additional Information: n/a

## 2023-09-13 ENCOUNTER — HOSPITAL ENCOUNTER (OUTPATIENT)
Facility: HOSPITAL | Age: 33
Discharge: HOME | DRG: 603 | End: 2023-09-16
Attending: EMERGENCY MEDICINE | Admitting: HOSPITALIST
Payer: MEDICAID

## 2023-09-13 DIAGNOSIS — L03.116 CELLULITIS OF LEFT LOWER LEG: ICD-10-CM

## 2023-09-13 DIAGNOSIS — R07.9 CHEST PAIN: ICD-10-CM

## 2023-09-13 DIAGNOSIS — L08.9 INFECTION OF ANTERIOR LOWER LEG: ICD-10-CM

## 2023-09-13 DIAGNOSIS — L03.116 CELLULITIS OF LEFT LOWER EXTREMITY: Primary | ICD-10-CM

## 2023-09-13 LAB
ALBUMIN SERPL BCP-MCNC: 3.8 G/DL (ref 3.5–5.2)
ALP SERPL-CCNC: 72 U/L (ref 55–135)
ALT SERPL W/O P-5'-P-CCNC: 26 U/L (ref 10–44)
AMPHET+METHAMPHET UR QL: ABNORMAL
ANION GAP SERPL CALC-SCNC: 11 MMOL/L (ref 8–16)
AST SERPL-CCNC: 22 U/L (ref 10–40)
B-HCG UR QL: NEGATIVE
BARBITURATES UR QL SCN>200 NG/ML: NEGATIVE
BASOPHILS # BLD AUTO: 0.03 K/UL (ref 0–0.2)
BASOPHILS NFR BLD: 0.3 % (ref 0–1.9)
BENZODIAZ UR QL SCN>200 NG/ML: NEGATIVE
BILIRUB SERPL-MCNC: 0.7 MG/DL (ref 0.1–1)
BILIRUB UR QL STRIP: NEGATIVE
BUN SERPL-MCNC: 10 MG/DL (ref 6–20)
BZE UR QL SCN: NEGATIVE
CALCIUM SERPL-MCNC: 9.1 MG/DL (ref 8.7–10.5)
CANNABINOIDS UR QL SCN: NEGATIVE
CHLORIDE SERPL-SCNC: 106 MMOL/L (ref 95–110)
CLARITY UR: CLEAR
CO2 SERPL-SCNC: 24 MMOL/L (ref 23–29)
COLOR UR: COLORLESS
CREAT SERPL-MCNC: 0.7 MG/DL (ref 0.5–1.4)
CREAT UR-MCNC: 12 MG/DL (ref 15–325)
CRP SERPL-MCNC: 112 MG/L (ref 0–8.2)
DIFFERENTIAL METHOD: NORMAL
EOSINOPHIL # BLD AUTO: 0.2 K/UL (ref 0–0.5)
EOSINOPHIL NFR BLD: 1.8 % (ref 0–8)
ERYTHROCYTE [DISTWIDTH] IN BLOOD BY AUTOMATED COUNT: 12.7 % (ref 11.5–14.5)
EST. GFR  (NO RACE VARIABLE): >60 ML/MIN/1.73 M^2
GLUCOSE SERPL-MCNC: 112 MG/DL (ref 70–110)
GLUCOSE UR QL STRIP: NEGATIVE
HCT VFR BLD AUTO: 37.2 % (ref 37–48.5)
HGB BLD-MCNC: 12.7 G/DL (ref 12–16)
HGB UR QL STRIP: NEGATIVE
IMM GRANULOCYTES # BLD AUTO: 0.03 K/UL (ref 0–0.04)
IMM GRANULOCYTES NFR BLD AUTO: 0.3 % (ref 0–0.5)
KETONES UR QL STRIP: NEGATIVE
LACTATE SERPL-SCNC: 0.7 MMOL/L (ref 0.5–2.2)
LEUKOCYTE ESTERASE UR QL STRIP: NEGATIVE
LYMPHOCYTES # BLD AUTO: 3 K/UL (ref 1–4.8)
LYMPHOCYTES NFR BLD: 29.2 % (ref 18–48)
MCH RBC QN AUTO: 30 PG (ref 27–31)
MCHC RBC AUTO-ENTMCNC: 34.1 G/DL (ref 32–36)
MCV RBC AUTO: 88 FL (ref 82–98)
METHADONE UR QL SCN>300 NG/ML: NEGATIVE
MONOCYTES # BLD AUTO: 0.7 K/UL (ref 0.3–1)
MONOCYTES NFR BLD: 7 % (ref 4–15)
NEUTROPHILS # BLD AUTO: 6.4 K/UL (ref 1.8–7.7)
NEUTROPHILS NFR BLD: 61.4 % (ref 38–73)
NITRITE UR QL STRIP: NEGATIVE
NRBC BLD-RTO: 0 /100 WBC
OPIATES UR QL SCN: NEGATIVE
PCP UR QL SCN>25 NG/ML: NEGATIVE
PH UR STRIP: 7 [PH] (ref 5–8)
PLATELET # BLD AUTO: 256 K/UL (ref 150–450)
PMV BLD AUTO: 11.1 FL (ref 9.2–12.9)
POTASSIUM SERPL-SCNC: 3.5 MMOL/L (ref 3.5–5.1)
PROT SERPL-MCNC: 6.8 G/DL (ref 6–8.4)
PROT UR QL STRIP: NEGATIVE
RBC # BLD AUTO: 4.23 M/UL (ref 4–5.4)
SODIUM SERPL-SCNC: 141 MMOL/L (ref 136–145)
SP GR UR STRIP: <1.005 (ref 1–1.03)
TOXICOLOGY INFORMATION: ABNORMAL
URN SPEC COLLECT METH UR: ABNORMAL
UROBILINOGEN UR STRIP-ACNC: NEGATIVE EU/DL
WBC # BLD AUTO: 10.4 K/UL (ref 3.9–12.7)

## 2023-09-13 PROCEDURE — 80307 DRUG TEST PRSMV CHEM ANLYZR: CPT | Performed by: NURSE PRACTITIONER

## 2023-09-13 PROCEDURE — 96365 THER/PROPH/DIAG IV INF INIT: CPT

## 2023-09-13 PROCEDURE — 25000003 PHARM REV CODE 250: Performed by: NURSE PRACTITIONER

## 2023-09-13 PROCEDURE — 81025 URINE PREGNANCY TEST: CPT | Performed by: NURSE PRACTITIONER

## 2023-09-13 PROCEDURE — 80053 COMPREHEN METABOLIC PANEL: CPT | Performed by: NURSE PRACTITIONER

## 2023-09-13 PROCEDURE — 81003 URINALYSIS AUTO W/O SCOPE: CPT | Mod: 59 | Performed by: NURSE PRACTITIONER

## 2023-09-13 PROCEDURE — 87040 BLOOD CULTURE FOR BACTERIA: CPT | Performed by: NURSE PRACTITIONER

## 2023-09-13 PROCEDURE — G0378 HOSPITAL OBSERVATION PER HR: HCPCS

## 2023-09-13 PROCEDURE — 85025 COMPLETE CBC W/AUTO DIFF WBC: CPT | Performed by: NURSE PRACTITIONER

## 2023-09-13 PROCEDURE — 83605 ASSAY OF LACTIC ACID: CPT | Performed by: NURSE PRACTITIONER

## 2023-09-13 PROCEDURE — 11000001 HC ACUTE MED/SURG PRIVATE ROOM

## 2023-09-13 PROCEDURE — 96361 HYDRATE IV INFUSION ADD-ON: CPT

## 2023-09-13 PROCEDURE — 86140 C-REACTIVE PROTEIN: CPT | Performed by: NURSE PRACTITIONER

## 2023-09-13 PROCEDURE — 99285 EMERGENCY DEPT VISIT HI MDM: CPT | Mod: 25

## 2023-09-13 RX ORDER — OXYCODONE AND ACETAMINOPHEN 10; 325 MG/1; MG/1
1 TABLET ORAL
Status: COMPLETED | OUTPATIENT
Start: 2023-09-14 | End: 2023-09-14

## 2023-09-13 RX ADMIN — VANCOMYCIN HYDROCHLORIDE 1500 MG: 1.5 INJECTION, POWDER, LYOPHILIZED, FOR SOLUTION INTRAVENOUS at 11:09

## 2023-09-13 RX ADMIN — SODIUM CHLORIDE 1000 ML: 9 INJECTION, SOLUTION INTRAVENOUS at 08:09

## 2023-09-14 PROBLEM — L03.116 CELLULITIS OF LEFT LOWER LEG: Status: ACTIVE | Noted: 2023-09-14

## 2023-09-14 PROCEDURE — 63600175 PHARM REV CODE 636 W HCPCS: Performed by: EMERGENCY MEDICINE

## 2023-09-14 PROCEDURE — 63600175 PHARM REV CODE 636 W HCPCS: Performed by: NURSE PRACTITIONER

## 2023-09-14 PROCEDURE — 25000003 PHARM REV CODE 250: Performed by: EMERGENCY MEDICINE

## 2023-09-14 PROCEDURE — 94761 N-INVAS EAR/PLS OXIMETRY MLT: CPT

## 2023-09-14 PROCEDURE — 25000003 PHARM REV CODE 250: Performed by: NURSE PRACTITIONER

## 2023-09-14 PROCEDURE — 96361 HYDRATE IV INFUSION ADD-ON: CPT

## 2023-09-14 PROCEDURE — 99232 SBSQ HOSP IP/OBS MODERATE 35: CPT | Mod: ,,, | Performed by: ORTHOPAEDIC SURGERY

## 2023-09-14 PROCEDURE — 96376 TX/PRO/DX INJ SAME DRUG ADON: CPT

## 2023-09-14 PROCEDURE — G0378 HOSPITAL OBSERVATION PER HR: HCPCS

## 2023-09-14 PROCEDURE — 96372 THER/PROPH/DIAG INJ SC/IM: CPT | Performed by: NURSE PRACTITIONER

## 2023-09-14 PROCEDURE — 96366 THER/PROPH/DIAG IV INF ADDON: CPT

## 2023-09-14 PROCEDURE — 96372 THER/PROPH/DIAG INJ SC/IM: CPT | Performed by: EMERGENCY MEDICINE

## 2023-09-14 PROCEDURE — 96375 TX/PRO/DX INJ NEW DRUG ADDON: CPT

## 2023-09-14 PROCEDURE — 90715 TDAP VACCINE 7 YRS/> IM: CPT | Performed by: EMERGENCY MEDICINE

## 2023-09-14 PROCEDURE — 99232 PR SUBSEQUENT HOSPITAL CARE,LEVL II: ICD-10-PCS | Mod: ,,, | Performed by: ORTHOPAEDIC SURGERY

## 2023-09-14 PROCEDURE — 90471 IMMUNIZATION ADMIN: CPT | Performed by: EMERGENCY MEDICINE

## 2023-09-14 PROCEDURE — 21400001 HC TELEMETRY ROOM

## 2023-09-14 RX ORDER — IPRATROPIUM BROMIDE AND ALBUTEROL SULFATE 2.5; .5 MG/3ML; MG/3ML
3 SOLUTION RESPIRATORY (INHALATION)
Status: CANCELLED | OUTPATIENT
Start: 2023-09-14

## 2023-09-14 RX ORDER — PROMETHAZINE HYDROCHLORIDE 25 MG/1
25 TABLET ORAL EVERY 6 HOURS PRN
Status: DISCONTINUED | OUTPATIENT
Start: 2023-09-14 | End: 2023-09-16 | Stop reason: HOSPADM

## 2023-09-14 RX ORDER — ACETAMINOPHEN 325 MG/1
650 TABLET ORAL EVERY 8 HOURS PRN
Status: DISCONTINUED | OUTPATIENT
Start: 2023-09-14 | End: 2023-09-16 | Stop reason: HOSPADM

## 2023-09-14 RX ORDER — NALOXONE HCL 0.4 MG/ML
0.02 VIAL (ML) INJECTION
Status: CANCELLED | OUTPATIENT
Start: 2023-09-14

## 2023-09-14 RX ORDER — IBUPROFEN 200 MG
24 TABLET ORAL
Status: CANCELLED | OUTPATIENT
Start: 2023-09-14

## 2023-09-14 RX ORDER — MORPHINE SULFATE 4 MG/ML
2 INJECTION, SOLUTION INTRAMUSCULAR; INTRAVENOUS EVERY 4 HOURS PRN
Status: DISCONTINUED | OUTPATIENT
Start: 2023-09-14 | End: 2023-09-16 | Stop reason: HOSPADM

## 2023-09-14 RX ORDER — IBUPROFEN 200 MG
16 TABLET ORAL
Status: DISCONTINUED | OUTPATIENT
Start: 2023-09-14 | End: 2023-09-16 | Stop reason: HOSPADM

## 2023-09-14 RX ORDER — DIPHENHYDRAMINE HYDROCHLORIDE 50 MG/ML
25 INJECTION INTRAMUSCULAR; INTRAVENOUS
Status: COMPLETED | OUTPATIENT
Start: 2023-09-14 | End: 2023-09-14

## 2023-09-14 RX ORDER — SODIUM CHLORIDE, SODIUM LACTATE, POTASSIUM CHLORIDE, CALCIUM CHLORIDE 600; 310; 30; 20 MG/100ML; MG/100ML; MG/100ML; MG/100ML
INJECTION, SOLUTION INTRAVENOUS CONTINUOUS
Status: CANCELLED | OUTPATIENT
Start: 2023-09-14

## 2023-09-14 RX ORDER — GLUCAGON 1 MG
1 KIT INJECTION
Status: DISCONTINUED | OUTPATIENT
Start: 2023-09-14 | End: 2023-09-16 | Stop reason: HOSPADM

## 2023-09-14 RX ORDER — NALOXONE HCL 0.4 MG/ML
0.02 VIAL (ML) INJECTION
Status: DISCONTINUED | OUTPATIENT
Start: 2023-09-14 | End: 2023-09-16 | Stop reason: HOSPADM

## 2023-09-14 RX ORDER — MAG HYDROX/ALUMINUM HYD/SIMETH 200-200-20
30 SUSPENSION, ORAL (FINAL DOSE FORM) ORAL 4 TIMES DAILY PRN
Status: CANCELLED | OUTPATIENT
Start: 2023-09-14

## 2023-09-14 RX ORDER — MORPHINE SULFATE 4 MG/ML
2 INJECTION, SOLUTION INTRAMUSCULAR; INTRAVENOUS EVERY 4 HOURS PRN
Status: CANCELLED | OUTPATIENT
Start: 2023-09-14

## 2023-09-14 RX ORDER — SIMETHICONE 80 MG
1 TABLET,CHEWABLE ORAL 4 TIMES DAILY PRN
Status: CANCELLED | OUTPATIENT
Start: 2023-09-14

## 2023-09-14 RX ORDER — TALC
6 POWDER (GRAM) TOPICAL NIGHTLY PRN
Status: CANCELLED | OUTPATIENT
Start: 2023-09-14

## 2023-09-14 RX ORDER — ONDANSETRON 2 MG/ML
4 INJECTION INTRAMUSCULAR; INTRAVENOUS EVERY 8 HOURS PRN
Status: CANCELLED | OUTPATIENT
Start: 2023-09-14

## 2023-09-14 RX ORDER — ACETAMINOPHEN 650 MG/1
650 SUPPOSITORY RECTAL EVERY 4 HOURS PRN
Status: CANCELLED | OUTPATIENT
Start: 2023-09-14

## 2023-09-14 RX ORDER — POLYETHYLENE GLYCOL 3350 17 G/17G
17 POWDER, FOR SOLUTION ORAL DAILY PRN
Status: CANCELLED | OUTPATIENT
Start: 2023-09-14

## 2023-09-14 RX ORDER — SODIUM CHLORIDE 0.9 % (FLUSH) 0.9 %
3 SYRINGE (ML) INJECTION EVERY 12 HOURS PRN
Status: DISCONTINUED | OUTPATIENT
Start: 2023-09-14 | End: 2023-09-16 | Stop reason: HOSPADM

## 2023-09-14 RX ORDER — POLYETHYLENE GLYCOL 3350 17 G/17G
17 POWDER, FOR SOLUTION ORAL DAILY PRN
Status: DISCONTINUED | OUTPATIENT
Start: 2023-09-14 | End: 2023-09-16 | Stop reason: HOSPADM

## 2023-09-14 RX ORDER — HYDROCODONE BITARTRATE AND ACETAMINOPHEN 5; 325 MG/1; MG/1
1 TABLET ORAL EVERY 6 HOURS PRN
Status: DISCONTINUED | OUTPATIENT
Start: 2023-09-14 | End: 2023-09-16 | Stop reason: HOSPADM

## 2023-09-14 RX ORDER — HYDROCODONE BITARTRATE AND ACETAMINOPHEN 5; 325 MG/1; MG/1
1 TABLET ORAL EVERY 6 HOURS PRN
Status: CANCELLED | OUTPATIENT
Start: 2023-09-14

## 2023-09-14 RX ORDER — ONDANSETRON 2 MG/ML
4 INJECTION INTRAMUSCULAR; INTRAVENOUS EVERY 8 HOURS PRN
Status: DISCONTINUED | OUTPATIENT
Start: 2023-09-14 | End: 2023-09-16 | Stop reason: HOSPADM

## 2023-09-14 RX ORDER — ACETAMINOPHEN 650 MG/1
650 SUPPOSITORY RECTAL EVERY 4 HOURS PRN
Status: DISCONTINUED | OUTPATIENT
Start: 2023-09-14 | End: 2023-09-16 | Stop reason: HOSPADM

## 2023-09-14 RX ORDER — SIMETHICONE 80 MG
1 TABLET,CHEWABLE ORAL 4 TIMES DAILY PRN
Status: DISCONTINUED | OUTPATIENT
Start: 2023-09-14 | End: 2023-09-16 | Stop reason: HOSPADM

## 2023-09-14 RX ORDER — ACETAMINOPHEN 325 MG/1
650 TABLET ORAL EVERY 8 HOURS PRN
Status: CANCELLED | OUTPATIENT
Start: 2023-09-14

## 2023-09-14 RX ORDER — SODIUM CHLORIDE 0.9 % (FLUSH) 0.9 %
SYRINGE (ML) INJECTION EVERY 12 HOURS PRN
Status: CANCELLED | OUTPATIENT
Start: 2023-09-14

## 2023-09-14 RX ORDER — IBUPROFEN 200 MG
16 TABLET ORAL
Status: CANCELLED | OUTPATIENT
Start: 2023-09-14

## 2023-09-14 RX ORDER — GLUCAGON 1 MG
1 KIT INJECTION
Status: CANCELLED | OUTPATIENT
Start: 2023-09-14

## 2023-09-14 RX ORDER — CYANOCOBALAMIN 1000 UG/ML
1000 INJECTION, SOLUTION INTRAMUSCULAR; SUBCUTANEOUS DAILY
Status: COMPLETED | OUTPATIENT
Start: 2023-09-14 | End: 2023-09-15

## 2023-09-14 RX ORDER — SODIUM CHLORIDE 9 MG/ML
INJECTION, SOLUTION INTRAVENOUS CONTINUOUS
Status: DISCONTINUED | OUTPATIENT
Start: 2023-09-14 | End: 2023-09-14

## 2023-09-14 RX ORDER — TALC
6 POWDER (GRAM) TOPICAL NIGHTLY PRN
Status: DISCONTINUED | OUTPATIENT
Start: 2023-09-14 | End: 2023-09-16 | Stop reason: HOSPADM

## 2023-09-14 RX ORDER — PROMETHAZINE HYDROCHLORIDE 25 MG/1
25 TABLET ORAL EVERY 6 HOURS PRN
Status: CANCELLED | OUTPATIENT
Start: 2023-09-14

## 2023-09-14 RX ORDER — IBUPROFEN 200 MG
24 TABLET ORAL
Status: DISCONTINUED | OUTPATIENT
Start: 2023-09-14 | End: 2023-09-16 | Stop reason: HOSPADM

## 2023-09-14 RX ORDER — MAG HYDROX/ALUMINUM HYD/SIMETH 200-200-20
30 SUSPENSION, ORAL (FINAL DOSE FORM) ORAL 4 TIMES DAILY PRN
Status: DISCONTINUED | OUTPATIENT
Start: 2023-09-14 | End: 2023-09-16 | Stop reason: HOSPADM

## 2023-09-14 RX ORDER — ENOXAPARIN SODIUM 100 MG/ML
40 INJECTION SUBCUTANEOUS EVERY 24 HOURS
Status: DISCONTINUED | OUTPATIENT
Start: 2023-09-14 | End: 2023-09-16 | Stop reason: HOSPADM

## 2023-09-14 RX ADMIN — CYANOCOBALAMIN 1000 MCG: 1000 INJECTION INTRAMUSCULAR; SUBCUTANEOUS at 06:09

## 2023-09-14 RX ADMIN — MORPHINE SULFATE 2 MG: 4 INJECTION INTRAVENOUS at 03:09

## 2023-09-14 RX ADMIN — MORPHINE SULFATE 2 MG: 4 INJECTION INTRAVENOUS at 11:09

## 2023-09-14 RX ADMIN — OXYCODONE AND ACETAMINOPHEN 1 TABLET: 10; 325 TABLET ORAL at 12:09

## 2023-09-14 RX ADMIN — TETANUS TOXOID, REDUCED DIPHTHERIA TOXOID AND ACELLULAR PERTUSSIS VACCINE, ADSORBED 0.5 ML: 5; 2.5; 8; 8; 2.5 SUSPENSION INTRAMUSCULAR at 12:09

## 2023-09-14 RX ADMIN — HYDROCODONE BITARTRATE AND ACETAMINOPHEN 1 TABLET: 5; 325 TABLET ORAL at 11:09

## 2023-09-14 RX ADMIN — SODIUM CHLORIDE: 9 INJECTION, SOLUTION INTRAVENOUS at 02:09

## 2023-09-14 RX ADMIN — MORPHINE SULFATE 2 MG: 4 INJECTION INTRAVENOUS at 09:09

## 2023-09-14 RX ADMIN — MORPHINE SULFATE 2 MG: 4 INJECTION INTRAVENOUS at 02:09

## 2023-09-14 RX ADMIN — MORPHINE SULFATE 2 MG: 4 INJECTION INTRAVENOUS at 07:09

## 2023-09-14 RX ADMIN — ONDANSETRON 4 MG: 2 INJECTION INTRAMUSCULAR; INTRAVENOUS at 07:09

## 2023-09-14 RX ADMIN — VANCOMYCIN HYDROCHLORIDE 750 MG: 750 INJECTION, POWDER, LYOPHILIZED, FOR SOLUTION INTRAVENOUS at 10:09

## 2023-09-14 RX ADMIN — DIPHENHYDRAMINE HYDROCHLORIDE 25 MG: 50 INJECTION, SOLUTION INTRAMUSCULAR; INTRAVENOUS at 12:09

## 2023-09-14 RX ADMIN — HYDROCODONE BITARTRATE AND ACETAMINOPHEN 1 TABLET: 5; 325 TABLET ORAL at 10:09

## 2023-09-14 RX ADMIN — VANCOMYCIN HYDROCHLORIDE 750 MG: 750 INJECTION, POWDER, LYOPHILIZED, FOR SOLUTION INTRAVENOUS at 11:09

## 2023-09-14 RX ADMIN — ENOXAPARIN SODIUM 40 MG: 40 INJECTION SUBCUTANEOUS at 05:09

## 2023-09-14 RX ADMIN — ONDANSETRON 4 MG: 2 INJECTION INTRAMUSCULAR; INTRAVENOUS at 02:09

## 2023-09-14 RX ADMIN — HYDROCODONE BITARTRATE AND ACETAMINOPHEN 1 TABLET: 5; 325 TABLET ORAL at 05:09

## 2023-09-14 NOTE — PROGRESS NOTES
Pt seen and examined, chart reviewed. Briefly, 33 y.o. female with a PMH of Anxiety disorder, Hep C, HTN, Drug abuse ( meth), Gestational diabetes, Iron deficiency anemia secondary to inadequate dietary iron intake, Seizures, and Thyroid disease. Presented to the ER for evaluation of worsening left lower extremity pain, redness, and swelling. Patient was seen at [facility] yesterday and was discharged with cellulitis and prescribed Keflex, Bactrim, Norco. Patient reports taking 1 dose of each of the antibiotics earlier today, but presented to the ER this evening for worsening symptoms.  Patient states she [place] in [place] a [place] days ago and felt like something bit her in her leg.  Patient was not able to identify what bit her. Denies any numbness/tingling distal to wound, no rash around the wound. ER workup unremarkable except elevated .  X-ray of left tib-fib was negative for acute findings. Blood cultures obtained x2. Patient initiated on IV Vancomycin in ED and admitted for Cellulitis/Abscess LLE.      Feels better, pain, redness LLE getting slowly better, +ve tenderness but no fluctuation. Elevated LLE with pillows. Cont IV Vanc.

## 2023-09-14 NOTE — FIRST PROVIDER EVALUATION
Medical screening examination initiated.  I have conducted a focused provider triage encounter, findings are as follows:    Brief history of present illness:  33-year-old female presents emergency room with worsening abscess to left lower leg.  [facility] and [facility].    Vitals:    09/13/23 1937   BP: 123/74   BP Location: Right arm   Patient Position: Sitting   Pulse: 60   Resp: 16   Temp: 98 °F (36.7 °C)   TempSrc: Oral   SpO2: 99%   Weight: 58 kg (127 lb 12.1 oz)       Pertinent physical exam:  Large amount of swelling and redness to left lower leg redness is outside the skin marker.    Brief workup plan:  Blood work possible admission    Preliminary workup initiated; this workup will be continued and followed by the physician or advanced practice provider that is assigned to the patient when roomed.
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
## 2023-09-14 NOTE — ASSESSMENT & PLAN NOTE
Currently normotensive. BP usually well controlled per patient with home medications.  Plan:  -Optimize pain control   -Continue home medications (Norvasc), titrate as needed   -Monitor BP  -Low salt/cardiac diet when not NPO  -IV hydralazine prn for SBP>160 or DBP>90

## 2023-09-14 NOTE — PROGRESS NOTES
Pharmacokinetic Initial Assessment: IV Vancomycin    Assessment/Plan:    Initiate intravenous vancomycin with loading dose of 1500 mg once followed by a maintenance dose of vancomycin 750 mg IV every 12 hours  Desired empiric serum trough concentration is 15 to 20 mcg/mL  Draw vancomycin trough level 60 min prior to fourth dose on 09/15 at approximately 1000  Pharmacy will continue to follow and monitor vancomycin.      Please contact pharmacy at extension [phone] with any questions regarding this assessment.     Thank you for the consult,   Mitaerika Rojas       Patient brief summary:  Jaymie Wilhelm is a 33 y.o. female initiated on antimicrobial therapy with IV Vancomycin for treatment of suspected skin & soft tissue infection (worsening left lower leg abscess)    Drug Allergies:   Review of patient's allergies indicates:   Allergen Reactions    Pneumococcal 23-yuridia ps vaccine      Patient refuses    Latex, natural rubber Rash    Tylenol-codeine #3 [acetaminophen-codeine] Hives       Actual Body Weight:   58 kg    Renal Function:   Estimated Creatinine Clearance: 92.1 mL/min (based on SCr of 0.7 mg/dL).,     Dialysis Method (if applicable):  N/A    CBC (last 72 hours):  Recent Labs   Lab Result Units 09/13/23 2031   WBC K/uL 10.40   Hemoglobin g/dL 12.7   Hematocrit % 37.2   Platelets K/uL 256   Gran % % 61.4   Lymph % % 29.2   Mono % % 7.0   Eosinophil % % 1.8   Basophil % % 0.3   Differential Method  Automated       Metabolic Panel (last 72 hours):  Recent Labs   Lab Result Units 09/13/23 2031 09/13/23  2143 09/13/23  2207   Sodium mmol/L 141  --   --    Potassium mmol/L 3.5  --   --    Chloride mmol/L 106  --   --    CO2 mmol/L 24  --   --    Glucose mg/dL 112*  --   --    Glucose, UA   --   --  Negative   BUN mg/dL 10  --   --    Creatinine mg/dL 0.7  --   --    Creatinine, Urine mg/dL  --  12.0*  --    Albumin g/dL 3.8  --   --    Total Bilirubin mg/dL 0.7  --   --    Alkaline Phosphatase U/L 72  --   --   "  AST U/L 22  --   --    ALT U/L 26  --   --        Drug levels (last 3 results):  No results for input(s): "VANCOMYCINRA", "VANCORANDOM", "VANCOMYCINPE", "VANCOPEAK", "VANCOMYCINTR", "VANCOTROUGH" in the last 72 hours.    Microbiologic Results:  Microbiology Results (last 7 days)       Procedure Component Value Units Date/Time    Blood culture #2 [[id number]] Collected: 09/13/23 2216    Order Status: Sent Specimen: Blood from Peripheral, Antecubital, Right Updated: 09/13/23 2217    Blood culture #1 [[id number]] Collected: 09/13/23 2031    Order Status: Sent Specimen: Blood from Peripheral, Antecubital, Right Updated: 09/13/23 2134            "
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
## 2023-09-14 NOTE — HPI
Jaymie Wilhelm is a 33 y.o. female with a PMH  has a past medical history of Anxiety disorder, Chronic hepatitis C without hepatic coma (12/31/2018), Chronic hypertension (2/12/2019), Drug abuse, First degree perineal laceration during delivery (1/22/2015), Gestational diabetes, Hepatitis C (2016), Iron deficiency anemia secondary to inadequate dietary iron intake (11/5/2019), Seizures, and Thyroid disease.  Presented to the ER for evaluation of worsening left lower extremity pain, redness, and swelling.  [facility] and was discharged with cellulitis and prescribed Keflex, Bactrim, Norco.  Patient reports taking 1 dose of each of the antibiotics earlier today, but presented to the ER this evening for worsening symptoms.  Patient states [place] and felt like something bit her in her leg.  Patient was not able to identify what bit her.  Denies any numbness/tingling distal to wound.  Denies any other associated symptoms such as fever, aches, chills, sweats, nausea, vomiting headache lightheadedness, dizziness, chest pain, palpitations, shortness of breath, dyspnea exertion, abdominal pain common bladder/bowel complaints, or any other symptoms ER workup unremarkable with the exception to elevated CRP of 112.  X-ray of left tib-fib was negative for acute findings.  Blood cultures obtained x2.  Patient initiated on vancomycin in ED. hospital Medicine consulted to admit patient for IV antibiotic therapy for left lower extremity cellulitis.  Patient in agreement with treatment plan.  Patient will be admitted under observation status.    PCP: Jak Engel

## 2023-09-15 LAB
CREAT SERPL-MCNC: 0.7 MG/DL (ref 0.5–1.4)
EST. GFR  (NO RACE VARIABLE): >60 ML/MIN/1.73 M^2
VANCOMYCIN TROUGH SERPL-MCNC: 5.7 UG/ML (ref 10–22)

## 2023-09-15 PROCEDURE — 63600175 PHARM REV CODE 636 W HCPCS: Performed by: NURSE PRACTITIONER

## 2023-09-15 PROCEDURE — 96366 THER/PROPH/DIAG IV INF ADDON: CPT

## 2023-09-15 PROCEDURE — 63600175 PHARM REV CODE 636 W HCPCS: Performed by: EMERGENCY MEDICINE

## 2023-09-15 PROCEDURE — 25000003 PHARM REV CODE 250: Performed by: EMERGENCY MEDICINE

## 2023-09-15 PROCEDURE — 99900035 HC TECH TIME PER 15 MIN (STAT)

## 2023-09-15 PROCEDURE — 99232 SBSQ HOSP IP/OBS MODERATE 35: CPT | Mod: ,,, | Performed by: PHYSICIAN ASSISTANT

## 2023-09-15 PROCEDURE — 25000003 PHARM REV CODE 250: Performed by: NURSE PRACTITIONER

## 2023-09-15 PROCEDURE — G0378 HOSPITAL OBSERVATION PER HR: HCPCS

## 2023-09-15 PROCEDURE — 82565 ASSAY OF CREATININE: CPT | Performed by: EMERGENCY MEDICINE

## 2023-09-15 PROCEDURE — 80202 ASSAY OF VANCOMYCIN: CPT | Performed by: EMERGENCY MEDICINE

## 2023-09-15 PROCEDURE — 96372 THER/PROPH/DIAG INJ SC/IM: CPT | Performed by: EMERGENCY MEDICINE

## 2023-09-15 PROCEDURE — 21400001 HC TELEMETRY ROOM

## 2023-09-15 PROCEDURE — 99232 PR SUBSEQUENT HOSPITAL CARE,LEVL II: ICD-10-PCS | Mod: ,,, | Performed by: PHYSICIAN ASSISTANT

## 2023-09-15 PROCEDURE — 96376 TX/PRO/DX INJ SAME DRUG ADON: CPT

## 2023-09-15 PROCEDURE — 36415 COLL VENOUS BLD VENIPUNCTURE: CPT | Performed by: EMERGENCY MEDICINE

## 2023-09-15 PROCEDURE — 94761 N-INVAS EAR/PLS OXIMETRY MLT: CPT

## 2023-09-15 PROCEDURE — 96372 THER/PROPH/DIAG INJ SC/IM: CPT | Performed by: NURSE PRACTITIONER

## 2023-09-15 RX ADMIN — VANCOMYCIN HYDROCHLORIDE 1250 MG: 1.25 INJECTION, POWDER, LYOPHILIZED, FOR SOLUTION INTRAVENOUS at 11:09

## 2023-09-15 RX ADMIN — CYANOCOBALAMIN 1000 MCG: 1000 INJECTION INTRAMUSCULAR; SUBCUTANEOUS at 09:09

## 2023-09-15 RX ADMIN — MORPHINE SULFATE 2 MG: 4 INJECTION INTRAVENOUS at 11:09

## 2023-09-15 RX ADMIN — VANCOMYCIN HYDROCHLORIDE 1250 MG: 1.25 INJECTION, POWDER, LYOPHILIZED, FOR SOLUTION INTRAVENOUS at 10:09

## 2023-09-15 RX ADMIN — ENOXAPARIN SODIUM 40 MG: 40 INJECTION SUBCUTANEOUS at 04:09

## 2023-09-15 RX ADMIN — HYDROCODONE BITARTRATE AND ACETAMINOPHEN 1 TABLET: 5; 325 TABLET ORAL at 10:09

## 2023-09-15 RX ADMIN — HYDROCODONE BITARTRATE AND ACETAMINOPHEN 1 TABLET: 5; 325 TABLET ORAL at 09:09

## 2023-09-15 NOTE — HOSPITAL COURSE
33 y.o. female with a PMH of Anxiety disorder, Hep C, HTN, Drug abuse ( meth), Gestational diabetes, Iron deficiency anemia secondary to inadequate dietary iron intake, Seizures, and Thyroid disease. Presented to the ER for evaluation of worsening left lower extremity pain, redness, and swelling. Patient was seen at [facility] yesterday and was discharged with cellulitis and prescribed Keflex, Bactrim, Norco. Patient reports taking 1 dose of each of the antibiotics earlier today, but presented to the ER this evening for worsening symptoms.  Patient states [place] and felt like something bit her in her leg.  Patient was not able to identify what bit her. Denies any numbness/tingling distal to wound, no rash around the wound. ER workup unremarkable except elevated .  X-ray of left tib-fib was negative for acute findings. Blood cultures obtained x2. Patient initiated on IV Vancomycin in ED and admitted for Cellulitis/Abscess LLE.       Feels better, pain, redness LLE getting slowly better, +ve tenderness but no fluctuation. Elevated LLE with pillows. Cont IV Vanc.     9/15- Appreciate ortho input- looks and feels better, LLE redness, swelling, tenderness improving, eschar getting smaller. Cont IV vanc and elevation LLE, no abscess. Cr 0.7.    9/16- looks and feels much better. LLE swelling, redness, tenderness improving, eschar also better, no abscess or pus present. VSS, Afeb. She received her IV Vanco and then was switched to oral Augmentin 875 mg PO BID which she will take for 7 days more and apply neosporin ointment to L leg tid. She is eating drinking well, walking around well. She was seen and examined and deemed stable for discharge home today.

## 2023-09-15 NOTE — PLAN OF CARE
Updated patient on plan of care. Instructed patient to use call light for assistance, call light in reach. Hourly rounding performed. Vitals q4 hours. PRN pain medications administered throughout shift. Education provided, questions answered/encouraged. Chart check complete. Pt not on telemetry monitor.    Problem: Adult Inpatient Plan of Care  Goal: Plan of Care Review  Outcome: Ongoing, Progressing

## 2023-09-15 NOTE — PLAN OF CARE
[facility] - Telemetry (Hospital)  Initial Discharge Assessment       Primary Care Provider: Jak Engel MD    Admission Diagnosis: Infection of anterior lower leg [L08.9]  Cellulitis of left lower extremity [L03.116]  Chest pain [R07.9]    Admission Date: 9/13/2023  Expected Discharge Date:     Transition of Care Barriers: Underinsured    Payor: MEDICAID / [facility] / Product Type: Managed Medicaid /     Extended Emergency Contact Information  Primary Emergency Contact: Gregorio Rangel   [place]  Mobile Phone: [phone]  Relation: Friend    Discharge Plan A: Home, Home with family         [id number] - [place] - [address] [facility] [address]  [address]  [place] [id number]  Phone: [phone] Fax: [phone]      Initial Assessment (most recent)       Adult Discharge Assessment - 09/15/23 1422          Discharge Assessment    Assessment Type Discharge Planning Assessment     Confirmed/corrected address, phone number and insurance Yes     Confirmed Demographics Correct on Facesheet     Source of Information patient     Communicated ADI with patient/caregiver Date not available/Unable to determine     Reason For Admission cellulitis     People in Home significant other     Facility Arrived From: home     Do you expect to return to your current living situation? Yes     Do you have help at home or someone to help you manage your care at home? Yes     Who are your caregiver(s) and their phone number(s)? independent, father and friend can assist     Prior to hospitilization cognitive status: Alert/Oriented     Current cognitive status: Alert/Oriented     Equipment Currently Used at Home none     Readmission within 30 days? No     Patient currently being followed by outpatient case management? No     Do you currently have service(s) that help you manage your care at home? No     Do you take prescription medications? Yes      Do you have prescription coverage? Yes     Do you have any problems affording any of your prescribed medications? TBD     Is the patient taking medications as prescribed? yes     Who is going to help you get home at discharge? father     How do you get to doctors appointments? car, drives self;family or friend will provide     Are you on dialysis? No     DME Needed Upon Discharge  none     Discharge Plan discussed with: Patient     Transition of Care Barriers Underinsured     Discharge Plan A Home;Home with family                      Anticipated DC Dispo: home   Prior Level of Function: independent  PCP: Dr Engel  Comments:  CM met with patient at bedside to introduce role and discuss d/c planning. Patient drowsy during conversation, states her father will provide transportation home upon d/c. CM following for needs.

## 2023-09-15 NOTE — PROGRESS NOTES
[facility] ([facility])  Orthopedics  Progress Note    Patient Name: Jaymie Wilhelm  MRN: [id number]  Admission Date: 9/13/2023  Hospital Length of Stay: 0 days  Attending Provider: Opal Zaragoza MD  Primary Care Provider: Jak Engel MD    Subjective:     Principal Problem:Cellulitis of left lower leg    Principal Orthopedic Problem: Cellulitis of left lower leg    Interval History: Jaymie Wilhelm is a 33-year-old female admitted for left lower leg cellulitis from an apparent insect bite.  Patient reports no significant change in symptoms overnight.  No new complaints at this time.    Review of patient's allergies indicates:   Allergen Reactions    Pneumococcal 23-yuridia ps vaccine      Patient refuses    Latex, natural rubber Rash    Tylenol-codeine #3 [acetaminophen-codeine] Hives    Vancomycin analogues Hives       Current Facility-Administered Medications   Medication    acetaminophen suppository 650 mg    acetaminophen tablet 650 mg    aluminum-magnesium hydroxide-simethicone 200-200-20 mg/5 mL suspension 30 mL    cyanocobalamin injection 1,000 mcg    dextrose 10% bolus 125 mL 125 mL    dextrose 10% bolus 250 mL 250 mL    enoxaparin injection 40 mg    glucagon (human recombinant) injection 1 mg    glucose chewable tablet 16 g    glucose chewable tablet 24 g    HYDROcodone-acetaminophen 5-325 mg per tablet 1 tablet    melatonin tablet 6 mg    morphine injection 2 mg    naloxone 0.4 mg/mL injection 0.02 mg    ondansetron injection 4 mg    polyethylene glycol packet 17 g    promethazine tablet 25 mg    simethicone chewable tablet 80 mg    sodium chloride 0.9% flush 3 mL    vancomycin - pharmacy to dose    vancomycin 750 mg in dextrose 5 % (D5W) 250 mL IVPB (Vial-Mate)     Facility-Administered Medications Ordered in Other Encounters   Medication    lactated ringers infusion     Objective:     Vital Signs (Most Recent):  Temp: 98.6 °F (37 °C) (09/15/23 0727)  Pulse: 64 (09/15/23 0727)  Resp: 14  "(09/15/23 0727)  BP: 136/87 (09/15/23 0727)  SpO2: 96 % (09/15/23 0727) Vital Signs (24h Range):  Temp:  [98 °F (36.7 °C)-98.6 °F (37 °C)] 98.6 °F (37 °C)  Pulse:  [50-64] 64  Resp:  [14-18] 14  SpO2:  [96 %-100 %] 96 %  BP: (136-161)/(79-96) 136/87     Weight: 57.9 kg (127 lb 11.8 oz)  Height: 5' 4" (162.6 cm)  Body mass index is 21.93 kg/m².      Intake/Output Summary (Last 24 hours) at 9/15/2023 0821  Last data filed at 9/15/2023 0416  Gross per 24 hour   Intake 100 ml   Output 450 ml   Net -350 ml       Ortho/SPM Exam  Left lower leg:   There is erythema, edema, and calor surrounding a small wound over the anterior mid shaft of the left lower leg  Patient is tender in this area   Calf and compartments are soft and compressible   Motor exam normal   Sensation and pulses intact  Cap refill brisk     GEN: Well developed, well nourished female. AAOX3. No acute distress.   Head: Normocephalic, atraumatic.   Eyes: JOSE  Neck: Trachea is midline, no adenopathy  Resp: Breathing unlabored.  Neuro: Motor function normal, Cranial nerves intact  Psych: Mood and affect appropriate.      Significant Labs:   Recent Lab Results         09/15/23  0458        Creatinine 0.7       eGFR >60               Significant Imaging: I have reviewed and interpreted all pertinent imaging results/findings.    Assessment/Plan:     Active Diagnoses:    Diagnosis Date Noted POA    PRINCIPAL PROBLEM:  Cellulitis of left lower leg [L03.116] 09/14/2023 Unknown    Chronic hypertension [I10] 02/12/2019 Yes      Problems Resolved During this Admission:     Assessment:  33-year-old female admitted for left lower leg cellulitis from an apparent insect bite     Plan:   Continue IV antibiotics   No indication for operative intervention at this time   We will continue to follow the patient    Colton Good PA-C  Orthopedics  [facility] ([facility])  "

## 2023-09-15 NOTE — ASSESSMENT & PLAN NOTE
Received Vancomycin in ED. XR of left tib/fib was negative. Blood Cx obtained x2.  Surrounding erythema border outlined with skin marker.  Plan:  -f/u blood Cx  -continue Abx  -analgesics prn  -antipyretics prn    Improving with IV Vanc, elevation LLE   What Type Of Note Output Would You Prefer (Optional)?: Bullet Format Hpi Title: Evaluation of Skin Lesions How Severe Are Your Spot(S)?: mild Have Your Spot(S) Been Treated In The Past?: has not been treated

## 2023-09-15 NOTE — SUBJECTIVE & OBJECTIVE
Interval History: Appreciate ortho input- looks and feels better, LLE redness, swelling, tenderness improving, eschar getting smaller. Cont IV vanc and elevation LLE, no abscess. Cr 0.7.    Review of Systems   Constitutional:  Negative for chills, diaphoresis, fatigue and fever.   Skin:  Positive for color change and wound.   All other systems reviewed and are negative.    Objective:     Vital Signs (Most Recent):  Temp: 98 °F (36.7 °C) (09/15/23 1554)  Pulse: 63 (09/15/23 1554)  Resp: 16 (09/15/23 1554)  BP: 131/78 (09/15/23 1554)  SpO2: 98 % (09/15/23 1554) Vital Signs (24h Range):  Temp:  [98 °F (36.7 °C)-98.6 °F (37 °C)] 98 °F (36.7 °C)  Pulse:  [50-72] 63  Resp:  [14-18] 16  SpO2:  [96 %-99 %] 98 %  BP: (129-161)/(78-94) 131/78     Weight: 57.9 kg (127 lb 11.8 oz)  Body mass index is 21.93 kg/m².    Intake/Output Summary (Last 24 hours) at 9/15/2023 1607  Last data filed at 9/15/2023 1200  Gross per 24 hour   Intake 833.09 ml   Output 1050 ml   Net -216.91 ml         Physical Exam  Vitals and nursing note reviewed.   Constitutional:       General: She is awake. She is not in acute distress.     Appearance: Normal appearance. She is well-developed and well-groomed. She is not ill-appearing, toxic-appearing or diaphoretic.   HENT:      Head: Normocephalic and atraumatic.   Eyes:      Extraocular Movements: Extraocular movements intact.      Conjunctiva/sclera: Conjunctivae normal.   Cardiovascular:      Rate and Rhythm: Normal rate and regular rhythm.      Heart sounds: Normal heart sounds. No murmur heard.  Pulmonary:      Effort: Pulmonary effort is normal.      Breath sounds: Normal breath sounds.   Abdominal:      General: Bowel sounds are normal.      Palpations: Abdomen is soft.      Tenderness: There is no abdominal tenderness.   Musculoskeletal:      Cervical back: Normal range of motion and neck supple.      Comments: 5/5 strength throughout   Skin:     General: Skin is warm and dry.      Capillary  Refill: Capillary refill takes less than 2 seconds.      Findings: Erythema and wound present.             Comments: Surrounding erythema around wound to left anterior shin.  Borders traced with skin marking pen.   Neurological:      General: No focal deficit present.      Mental Status: She is alert and oriented to person, place, and time. Mental status is at baseline.      GCS: GCS eye subscore is 4. GCS verbal subscore is 5. GCS motor subscore is 6.      Cranial Nerves: Cranial nerves 2-12 are intact.      Sensory: Sensation is intact.      Motor: Motor function is intact.   Psychiatric:         Mood and Affect: Mood normal.         Speech: Speech normal.         Behavior: Behavior normal. Behavior is cooperative.         Thought Content: Thought content normal.         Judgment: Judgment normal.             Significant Labs: All pertinent labs within the past 24 hours have been reviewed.  CBC:   Recent Labs   Lab 09/13/23 2031   WBC 10.40   HGB 12.7   HCT 37.2        CMP:   Recent Labs   Lab 09/13/23  2031 09/15/23  0458     --    K 3.5  --      --    CO2 24  --    *  --    BUN 10  --    CREATININE 0.7 0.7   CALCIUM 9.1  --    PROT 6.8  --    ALBUMIN 3.8  --    BILITOT 0.7  --    ALKPHOS 72  --    AST 22  --    ALT 26  --    ANIONGAP 11  --        Significant Imaging: I have reviewed all pertinent imaging results/findings within the past 24 hours.

## 2023-09-15 NOTE — ASSESSMENT & PLAN NOTE
Currently normotensive. BP usually well controlled per patient with home medications.  Plan:  -Optimize pain control   -Continue home medications (Norvasc), titrate as needed   -Monitor BP  -Low salt/cardiac diet when not NPO  -IV hydralazine prn for SBP>160 or DBP>90       BP under control, norvasc resumed

## 2023-09-15 NOTE — CONSULTS
Pharmacokinetic Assessment Follow Up: IV Vancomycin    Vancomycin serum concentration assessment(s):    The trough level was drawn correctly and can be used to guide therapy at this time. The measurement is below the desired definitive target range of 10 to 15 mcg/mL.    Vancomycin Regimen Plan:    Change regimen to Vancomycin 1250 mg IV every 12 hours with next serum trough concentration measured at 1000 prior to 3rd new dose on 09/16/23    Drug levels (last 3 results):  Recent Labs   Lab Result Units 09/15/23  1001   Vancomycin-Trough ug/mL 5.7*       Pharmacy will continue to follow and monitor vancomycin.    Please contact pharmacy at extension [phone] for questions regarding this assessment.    Thank you for the consult,   Shabnam Aguilar, PharmD Candidate 2024         Patient brief summary:  Jaymie Wilhelm is a 33 y.o. female initiated on antimicrobial therapy with IV Vancomycin for treatment of skin & soft tissue infection    The patient's current regimen is Vancomycin 750 mg IV q12h. Regimen is being changed due to subtherapeutic level.    Drug Allergies:   Review of patient's allergies indicates:   Allergen Reactions    Pneumococcal 23-yuridia ps vaccine      Patient refuses    Latex, natural rubber Rash    Tylenol-codeine #3 [acetaminophen-codeine] Hives    Vancomycin analogues Hives       Actual Body Weight:   57.9 kg    Renal Function:   Estimated Creatinine Clearance: 100.6 mL/min (based on SCr of 0.7 mg/dL).,     Dialysis Method (if applicable):  N/A    CBC (last 72 hours):  Recent Labs   Lab Result Units 09/13/23 2031   WBC K/uL 10.40   Hemoglobin g/dL 12.7   Hematocrit % 37.2   Platelets K/uL 256   Gran % % 61.4   Lymph % % 29.2   Mono % % 7.0   Eosinophil % % 1.8   Basophil % % 0.3   Differential Method  Automated       Metabolic Panel (last 72 hours):  Recent Labs   Lab Result Units 09/13/23 2031 09/13/23  2143 09/13/23 2207 09/15/23  0458   Sodium mmol/L 141  --   --   --    Potassium mmol/L  3.5  --   --   --    Chloride mmol/L 106  --   --   --    CO2 mmol/L 24  --   --   --    Glucose mg/dL 112*  --   --   --    Glucose, UA   --   --  Negative  --    BUN mg/dL 10  --   --   --    Creatinine mg/dL 0.7  --   --  0.7   Creatinine, Urine mg/dL  --  12.0*  --   --    Albumin g/dL 3.8  --   --   --    Total Bilirubin mg/dL 0.7  --   --   --    Alkaline Phosphatase U/L 72  --   --   --    AST U/L 22  --   --   --    ALT U/L 26  --   --   --        Vancomycin Administrations:  vancomycin given in the last 96 hours                     vancomycin 1,250 mg in dextrose 5 % (D5W) 250 mL IVPB (Vial-Mate) (mg) 1,250 mg New Bag 09/15/23 1101    vancomycin 750 mg in dextrose 5 % (D5W) 250 mL IVPB (Vial-Mate) (mg) 750 mg New Bag 09/14/23 2254     750 mg New Bag  1104    vancomycin 1,500 mg in dextrose 5 % (D5W) 250 mL IVPB (Vial-Mate) (mg) 1,500 mg New Bag 09/13/23 2317                    Microbiologic Results:  Microbiology Results (last 7 days)       Procedure Component Value Units Date/Time    Blood culture #1 [[id number]] Collected: 09/13/23 2031    Order Status: Completed Specimen: Blood from Peripheral, Antecubital, Right Updated: 09/15/23 0613     Blood Culture, Routine No Growth to date      No Growth to date    Narrative:      Blood Culture #1    Blood culture #2 [[id number]] Collected: 09/13/23 2216    Order Status: Completed Specimen: Blood from Peripheral, Antecubital, Right Updated: 09/15/23 0613     Blood Culture, Routine No Growth to date      No Growth to date    Narrative:      Blood Culture #2          Thank you for allowing us to participate in this patient's care.     Shabnam Aguilar, PharmD Candidate 2024

## 2023-09-15 NOTE — PROGRESS NOTES
[facility] Medicine  Progress Note    Patient Name: Jaymie Wilhelm  MRN: [id number]  Patient Class: OP- Observation   Admission Date: 9/13/2023  Length of Stay: 0 days  Attending Physician: Opal Zaragoza MD  Primary Care Provider: Jak Engel MD        Subjective:     Principal Problem:Cellulitis of left lower leg        HPI:  Jaymie Wilhelm is a 33 y.o. female with a PMH  has a past medical history of Anxiety disorder, Chronic hepatitis C without hepatic coma (12/31/2018), Chronic hypertension (2/12/2019), Drug abuse, First degree perineal laceration during delivery (1/22/2015), Gestational diabetes, Hepatitis C (2016), Iron deficiency anemia secondary to inadequate dietary iron intake (11/5/2019), Seizures, and Thyroid disease.  Presented to the ER for evaluation of worsening left lower extremity pain, redness, and swelling.  Patient was seen at [facility] yesterday and was discharged with cellulitis and prescribed Keflex, Bactrim, Norco.  Patient reports taking 1 dose of each of the antibiotics earlier today, but presented to the ER this evening for worsening symptoms.  Patient states [place] and felt like something bit her in her leg.  Patient was not able to identify what bit her.  Denies any numbness/tingling distal to wound.  Denies any other associated symptoms such as fever, aches, chills, sweats, nausea, vomiting headache lightheadedness, dizziness, chest pain, palpitations, shortness of breath, dyspnea exertion, abdominal pain common bladder/bowel complaints, or any other symptoms ER workup unremarkable with the exception to elevated CRP of 112.  X-ray of left tib-fib was negative for acute findings.  Blood cultures obtained x2.  Patient initiated on vancomycin in ED. hospital Medicine consulted to admit patient for IV antibiotic therapy for left lower extremity cellulitis.  Patient in agreement with  treatment plan.  Patient will be admitted under observation status.    PCP: Jak Engel        Overview/Hospital Course:  33 y.o. female with a PMH of Anxiety disorder, Hep C, HTN, Drug abuse ( meth), Gestational diabetes, Iron deficiency anemia secondary to inadequate dietary iron intake, Seizures, and Thyroid disease. Presented to the ER for evaluation of worsening left lower extremity pain, redness, and swelling. Patient was seen at [facility] yesterday and was discharged with cellulitis and prescribed Keflex, Bactrim, Norco. Patient reports taking 1 dose of each of the antibiotics earlier today, but presented to the ER this evening for worsening symptoms.  Patient states [place] and felt like something bit her in her leg.  Patient was not able to identify what bit her. Denies any numbness/tingling distal to wound, no rash around the wound. ER workup unremarkable except elevated .  X-ray of left tib-fib was negative for acute findings. Blood cultures obtained x2. Patient initiated on IV Vancomycin in ED and admitted for Cellulitis/Abscess LLE.       Feels better, pain, redness LLE getting slowly better, +ve tenderness but no fluctuation. Elevated LLE with pillows. Cont IV Vanc.     9/15- Appreciate ortho input- looks and feels better, LLE redness, swelling, tenderness improving, eschar getting smaller. Cont IV vanc and elevation LLE, no abscess. Cr 0.7.      Interval History: Appreciate ortho input- looks and feels better, LLE redness, swelling, tenderness improving, eschar getting smaller. Cont IV vanc and elevation LLE, no abscess. Cr 0.7.    Review of Systems   Constitutional:  Negative for chills, diaphoresis, fatigue and fever.   Skin:  Positive for color change and wound.   All other systems reviewed and are negative.    Objective:     Vital Signs (Most Recent):  Temp: 98 °F (36.7 °C) (09/15/23 1554)  Pulse: 63 (09/15/23 1554)  Resp: 16 (09/15/23  1554)  BP: 131/78 (09/15/23 1554)  SpO2: 98 % (09/15/23 1554) Vital Signs (24h Range):  Temp:  [98 °F (36.7 °C)-98.6 °F (37 °C)] 98 °F (36.7 °C)  Pulse:  [50-72] 63  Resp:  [14-18] 16  SpO2:  [96 %-99 %] 98 %  BP: (129-161)/(78-94) 131/78     Weight: 57.9 kg (127 lb 11.8 oz)  Body mass index is 21.93 kg/m².    Intake/Output Summary (Last 24 hours) at 9/15/2023 1607  Last data filed at 9/15/2023 1200  Gross per 24 hour   Intake 833.09 ml   Output 1050 ml   Net -216.91 ml         Physical Exam  Vitals and nursing note reviewed.   Constitutional:       General: She is awake. She is not in acute distress.     Appearance: Normal appearance. She is well-developed and well-groomed. She is not ill-appearing, toxic-appearing or diaphoretic.   HENT:      Head: Normocephalic and atraumatic.   Eyes:      Extraocular Movements: Extraocular movements intact.      Conjunctiva/sclera: Conjunctivae normal.   Cardiovascular:      Rate and Rhythm: Normal rate and regular rhythm.      Heart sounds: Normal heart sounds. No murmur heard.  Pulmonary:      Effort: Pulmonary effort is normal.      Breath sounds: Normal breath sounds.   Abdominal:      General: Bowel sounds are normal.      Palpations: Abdomen is soft.      Tenderness: There is no abdominal tenderness.   Musculoskeletal:      Cervical back: Normal range of motion and neck supple.      Comments: 5/5 strength throughout   Skin:     General: Skin is warm and dry.      Capillary Refill: Capillary refill takes less than 2 seconds.      Findings: Erythema and wound present.             Comments: Surrounding erythema around wound to left anterior shin.  Borders traced with skin marking pen.   Neurological:      General: No focal deficit present.      Mental Status: She is alert and oriented to person, place, and time. Mental status is at baseline.      GCS: GCS eye subscore is 4. GCS verbal subscore is 5. GCS motor subscore is 6.      Cranial Nerves: Cranial nerves 2-12 are intact.       Sensory: Sensation is intact.      Motor: Motor function is intact.   Psychiatric:         Mood and Affect: Mood normal.         Speech: Speech normal.         Behavior: Behavior normal. Behavior is cooperative.         Thought Content: Thought content normal.         Judgment: Judgment normal.             Significant Labs: All pertinent labs within the past 24 hours have been reviewed.  CBC:   Recent Labs   Lab 09/13/23 2031   WBC 10.40   HGB 12.7   HCT 37.2        CMP:   Recent Labs   Lab 09/13/23  2031 09/15/23  0458     --    K 3.5  --      --    CO2 24  --    *  --    BUN 10  --    CREATININE 0.7 0.7   CALCIUM 9.1  --    PROT 6.8  --    ALBUMIN 3.8  --    BILITOT 0.7  --    ALKPHOS 72  --    AST 22  --    ALT 26  --    ANIONGAP 11  --        Significant Imaging: I have reviewed all pertinent imaging results/findings within the past 24 hours.      Assessment/Plan:      * Cellulitis of left lower leg  Received Vancomycin in ED. XR of left tib/fib was negative. Blood Cx obtained x2.  Surrounding erythema border outlined with skin marker.  Plan:  -f/u blood Cx  -continue Abx  -analgesics prn  -antipyretics prn    Improving with IV Vanc, elevation LLE    Chronic hypertension  Currently normotensive. BP usually well controlled per patient with home medications.  Plan:  -Optimize pain control   -Continue home medications (Norvasc), titrate as needed   -Monitor BP  -Low salt/cardiac diet when not NPO  -IV hydralazine prn for SBP>160 or DBP>90       BP under control, norvasc resumed          VTE Risk Mitigation (From admission, onward)         Ordered     enoxaparin injection 40 mg  Daily         09/14/23 0037     IP VTE HIGH RISK PATIENT  Once         09/14/23 0037     Place sequential compression device  Until discontinued         09/14/23 0037                Discharge Planning   ADI:      Code Status: Full Code   Is the patient medically ready for discharge?:     Reason for patient  still in hospital (select all that apply): Patient trending condition, Laboratory test, Treatment, Imaging and Consult recommendations  Discharge Plan A: Home, Home with family        Opal Zaragoza MD  Department of Hospital Medicine   [facility] ([facility])

## 2023-09-15 NOTE — PLAN OF CARE
Pt oriented x4 VSS  Plan of care reviewed. pt verbalizes understanding.  Patient moving/ turning independently.  Bed low, side rails up x2, wheels locked, call light in reach.  Pt instructed to call for assistance
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
## 2023-09-16 VITALS
HEART RATE: 59 BPM | OXYGEN SATURATION: 97 % | HEIGHT: 64 IN | SYSTOLIC BLOOD PRESSURE: 126 MMHG | RESPIRATION RATE: 16 BRPM | BODY MASS INDEX: 21.81 KG/M2 | TEMPERATURE: 98 F | WEIGHT: 127.75 LBS | DIASTOLIC BLOOD PRESSURE: 73 MMHG

## 2023-09-16 PROBLEM — L03.116 CELLULITIS OF LEFT LOWER LEG: Status: RESOLVED | Noted: 2023-09-14 | Resolved: 2023-09-16

## 2023-09-16 LAB
CREAT SERPL-MCNC: 0.6 MG/DL (ref 0.5–1.4)
EST. GFR  (NO RACE VARIABLE): >60 ML/MIN/1.73 M^2
VANCOMYCIN TROUGH SERPL-MCNC: 10.3 UG/ML (ref 10–22)

## 2023-09-16 PROCEDURE — 25000003 PHARM REV CODE 250: Performed by: EMERGENCY MEDICINE

## 2023-09-16 PROCEDURE — 96366 THER/PROPH/DIAG IV INF ADDON: CPT

## 2023-09-16 PROCEDURE — 99231 SBSQ HOSP IP/OBS SF/LOW 25: CPT | Mod: ,,, | Performed by: ORTHOPAEDIC SURGERY

## 2023-09-16 PROCEDURE — 99231 PR SUBSEQUENT HOSPITAL CARE,LEVL I: ICD-10-PCS | Mod: ,,, | Performed by: ORTHOPAEDIC SURGERY

## 2023-09-16 PROCEDURE — 82565 ASSAY OF CREATININE: CPT | Performed by: EMERGENCY MEDICINE

## 2023-09-16 PROCEDURE — 63600175 PHARM REV CODE 636 W HCPCS: Performed by: EMERGENCY MEDICINE

## 2023-09-16 PROCEDURE — 80202 ASSAY OF VANCOMYCIN: CPT | Performed by: EMERGENCY MEDICINE

## 2023-09-16 PROCEDURE — 36415 COLL VENOUS BLD VENIPUNCTURE: CPT | Performed by: EMERGENCY MEDICINE

## 2023-09-16 PROCEDURE — G0378 HOSPITAL OBSERVATION PER HR: HCPCS

## 2023-09-16 PROCEDURE — 94761 N-INVAS EAR/PLS OXIMETRY MLT: CPT

## 2023-09-16 PROCEDURE — 25000003 PHARM REV CODE 250: Performed by: NURSE PRACTITIONER

## 2023-09-16 RX ORDER — AMOXICILLIN AND CLAVULANATE POTASSIUM 875; 125 MG/1; MG/1
1 TABLET, FILM COATED ORAL EVERY 12 HOURS
Qty: 20 TABLET | Refills: 0 | Status: SHIPPED | OUTPATIENT
Start: 2023-09-16

## 2023-09-16 RX ORDER — AMOXICILLIN AND CLAVULANATE POTASSIUM 875; 125 MG/1; MG/1
1 TABLET, FILM COATED ORAL ONCE
Status: COMPLETED | OUTPATIENT
Start: 2023-09-16 | End: 2023-09-16

## 2023-09-16 RX ADMIN — VANCOMYCIN HYDROCHLORIDE 1250 MG: 1.25 INJECTION, POWDER, LYOPHILIZED, FOR SOLUTION INTRAVENOUS at 10:09

## 2023-09-16 RX ADMIN — AMOXICILLIN AND CLAVULANATE POTASSIUM 1 TABLET: 875; 125 TABLET, FILM COATED ORAL at 01:09

## 2023-09-16 RX ADMIN — ACETAMINOPHEN 650 MG: 325 TABLET ORAL at 10:09

## 2023-09-16 RX ADMIN — HYDROCODONE BITARTRATE AND ACETAMINOPHEN 1 TABLET: 5; 325 TABLET ORAL at 09:09

## 2023-09-16 RX ADMIN — BACITRACIN ZINC, NEOMYCIN, POLYMYXIN B: 400; 3.5; 5 OINTMENT TOPICAL at 10:09

## 2023-09-16 NOTE — PROGRESS NOTES
Jaymie Wilhelm is a 33 y.o. female patient.     Subjective    The patient has continued on vancomycin for her left leg insect bite and subsequent infection.  Continues to have pain with dependency.  No fever.    1. Cellulitis of left lower extremity    2. Infection of anterior lower leg    3. Chest pain    4. Cellulitis of left lower leg      Past Medical History:   Diagnosis Date    Anxiety disorder     Chronic hepatitis C without hepatic coma 2018    Chronic hypertension 2019    Drug abuse     klonopin     First degree perineal laceration during delivery 2015    Gestational diabetes     1st pregnancy    Hepatitis C 2016    Iron deficiency anemia secondary to inadequate dietary iron intake 2019    Seizures     klonopin sz from w/d (hx of drug abuse)    Thyroid disease      Past Surgical History Pertinent Negatives:   Procedure Date Noted    APPENDECTOMY 2014    CERVICAL BIOPSY  W/ LOOP ELECTRODE EXCISION 2014     SECTION 2014    CHOLECYSTECTOMY 2014    DILATION AND CURETTAGE OF UTERUS 2014    HYSTERECTOMY 2014    INGUINAL HERNIA REPAIR 2014    MASTECTOMY 2014    OOPHORECTOMY 2014    TUBAL LIGATION 2014    UMBILICAL HERNIA REPAIR 2014     Scheduled Meds:   enoxparin  40 mg Subcutaneous Daily    neomycin-bacitracin-polymyxin   Topical (Top) TID    vancomycin (VANCOCIN) IV (PEDS and ADULTS)  1,250 mg Intravenous Q12H     Continuous Infusions:  PRN Meds:acetaminophen, acetaminophen, aluminum-magnesium hydroxide-simethicone, dextrose 10%, dextrose 10%, glucagon (human recombinant), glucose, glucose, HYDROcodone-acetaminophen, melatonin, morphine, naloxone, ondansetron, polyethylene glycol, promethazine, simethicone, sodium chloride 0.9%, Pharmacy to dose Vancomycin consult **AND** vancomycin - pharmacy to dose    Review of patient's allergies indicates:   Allergen Reactions    Pneumococcal 23-yuridia ps vaccine      Patient  "refuses    Latex, natural rubber Rash    Tylenol-codeine #3 [acetaminophen-codeine] Hives    Vancomycin analogues Hives     Active Hospital Problems    Diagnosis  POA    Chronic hypertension [I10]  Yes     Per patient was on medication but she stopped taking it.  Daily ASA  Patient to keep BP log and bring in, states she checks it at home daily and has been high        Resolved Hospital Problems    Diagnosis Date Resolved POA    *Cellulitis of left lower leg [L03.116] 09/16/2023 Yes       Objective:  Vital signs (most recent): Blood pressure 126/73, pulse (!) 59, temperature 98 °F (36.7 °C), temperature source Oral, resp. rate 16, height 5' 4" (1.626 m), weight 57.9 kg (127 lb 11.8 oz), SpO2 97 %, not currently breastfeeding.      Developed well-nourished female in no acute distress.  She is awake, alert, orient with evaluation.    Left leg with decreased swelling and cellulitis.  The over the anterior mid shin is slightly larger.  Tender there.     Assessment & Plan       The patient had insect bite to the left leg.  Subsequent infection.  Discussed with Dr. Zaragoza.  He plans to discharge the patient on oral abx.  Follow-up with her PCP    9/16/2023        Ty Chacon MD, FAAOS [facility], Orthopedic Trauma Service  [place]    "

## 2023-09-16 NOTE — PLAN OF CARE
Patient with cellulitis to her LLE and being treated with IV antibiotics.  Patients states she is ready to go home because they are not doing anything for her here.  Explained that the IV antibiotics are going to help with the cellulitis and infection.  Verbalizes understanding.

## 2023-09-16 NOTE — PLAN OF CARE
Patient discharged with personal belongings. Discharge education and medications reviewed with patient. LDA removed per MD order. Last dose of vancomycin given before DC. Hard copy of discharge medications given to patient.   Problem: Adult Inpatient Plan of Care  Goal: Plan of Care Review  Outcome: Met  Goal: Patient-Specific Goal (Individualized)  Outcome: Met  Goal: Absence of Hospital-Acquired Illness or Injury  Outcome: Met  Goal: Optimal Comfort and Wellbeing  Outcome: Met  Goal: Readiness for Transition of Care  Outcome: Met     Problem: Skin or Soft Tissue Infection  Goal: Absence of Infection Signs and Symptoms  Outcome: Met

## 2023-09-16 NOTE — PROGRESS NOTES
Pharmacokinetic Assessment Follow Up: IV Vancomycin    Vancomycin serum concentration assessment(s):    The trough level was drawn correctly and can be used to guide therapy at this time. The measurement is within the desired definitive target range of 10 to 15 mcg/mL.    Vancomycin Regimen Plan:    Dose Vancomycin 1250mg IVPB before discharge.    Drug levels (last 3 results):  Recent Labs   Lab Result Units 09/15/23  1001 09/16/23  0951   Vancomycin-Trough ug/mL 5.7* 10.3       Pharmacy will continue to follow and monitor vancomycin.    Please contact pharmacy at extension [phone] for questions regarding this assessment.    Thank you for the consult,   Valeria Oneil       Patient brief summary:  Jaymie Wilhelm is a 33 y.o. female initiated on antimicrobial therapy with IV Vancomycin for treatment of skin & soft tissue infection    Drug Allergies:   Review of patient's allergies indicates:   Allergen Reactions    Pneumococcal 23-yuridia ps vaccine      Patient refuses    Latex, natural rubber Rash    Tylenol-codeine #3 [acetaminophen-codeine] Hives    Vancomycin analogues Hives       Actual Body Weight:   57.9 kg    Renal Function:   Estimated Creatinine Clearance: 117.3 mL/min (based on SCr of 0.6 mg/dL).,     Dialysis Method (if applicable):  N/A    CBC (last 72 hours):  Recent Labs   Lab Result Units 09/13/23 2031   WBC K/uL 10.40   Hemoglobin g/dL 12.7   Hematocrit % 37.2   Platelets K/uL 256   Gran % % 61.4   Lymph % % 29.2   Mono % % 7.0   Eosinophil % % 1.8   Basophil % % 0.3   Differential Method  Automated       Metabolic Panel (last 72 hours):  Recent Labs   Lab Result Units 09/13/23 2031 09/13/23  2143 09/13/23  2207 09/15/23  0458 09/16/23  0533   Sodium mmol/L 141  --   --   --   --    Potassium mmol/L 3.5  --   --   --   --    Chloride mmol/L 106  --   --   --   --    CO2 mmol/L 24  --   --   --   --    Glucose mg/dL 112*  --   --   --   --    Glucose, UA   --   --  Negative  --   --    BUN  mg/dL 10  --   --   --   --    Creatinine mg/dL 0.7  --   --  0.7 0.6   Creatinine, Urine mg/dL  --  12.0*  --   --   --    Albumin g/dL 3.8  --   --   --   --    Total Bilirubin mg/dL 0.7  --   --   --   --    Alkaline Phosphatase U/L 72  --   --   --   --    AST U/L 22  --   --   --   --    ALT U/L 26  --   --   --   --        Vancomycin Administrations:  vancomycin given in the last 96 hours                     vancomycin 1,250 mg in dextrose 5 % (D5W) 250 mL IVPB (Vial-Mate) (mg) 1,250 mg New Bag 09/15/23 2230     1,250 mg New Bag  1101    vancomycin 750 mg in dextrose 5 % (D5W) 250 mL IVPB (Vial-Mate) (mg) 750 mg New Bag 09/14/23 2254     750 mg New Bag  1104    vancomycin 1,500 mg in dextrose 5 % (D5W) 250 mL IVPB (Vial-Mate) (mg) 1,500 mg New Bag 09/13/23 2317                    Microbiologic Results:  Microbiology Results (last 7 days)       Procedure Component Value Units Date/Time    Blood culture #1 [[id number]] Collected: 09/13/23 2031    Order Status: Completed Specimen: Blood from Peripheral, Antecubital, Right Updated: 09/16/23 0612     Blood Culture, Routine No Growth to date      No Growth to date      No Growth to date    Narrative:      Blood Culture #1    Blood culture #2 [[id number]] Collected: 09/13/23 2216    Order Status: Completed Specimen: Blood from Peripheral, Antecubital, Right Updated: 09/16/23 0612     Blood Culture, Routine No Growth to date      No Growth to date      No Growth to date    Narrative:      Blood Culture #2

## 2023-09-16 NOTE — NURSING
Patient laying in bed with LLE on pillow.  Area on shin red and warm to touch.  Assessment per flowsheet. Denies any pain or discomfort at this time. Patient did ambulate to bathroom with noted limp.  Safety maintained.  Will continue to follow current plan of care.

## 2023-09-16 NOTE — PLAN OF CARE
[facility] - Telemetry (Hospital)  Discharge Final Note    Primary Care Provider: Jak Engel MD    Expected Discharge Date: 9/16/2023    Final Discharge Note (most recent)       Final Note - 09/16/23 1003          Final Note    Assessment Type Final Discharge Note     Anticipated Discharge Disposition Home or Self Care        Post-Acute Status    Discharge Delays None known at this time                   No needs at the time of chart review. KM,MSW    Important Message from Medicare             Contact Info       Jak Engel MD   Specialty: Family Medicine   Relationship: PCP - General    [address]  [facility] [address]   Phone: [phone]       Next Steps: Schedule an appointment as soon as possible for a visit in 3 day(s)    Instructions: As needed, Hospital follow up

## 2023-09-17 NOTE — DISCHARGE SUMMARY
[facility] Hospital Medicine  Discharge Summary      Patient Name: Jaymie Wilhelm  MRN: [id number]  [facility] [id number]  Patient Class: IP- Inpatient  Admission Date: 9/13/2023  Hospital Length of Stay: 1 days  Discharge Date and Time: 9/16/2023  2:47 PM  Attending Physician: Alejandra att. providers found   Discharging Provider: Opal Zaragoza MD  Primary Care Provider: Jak Engel MD    Primary Care Team: Networked reference to record PCT     HPI:   Jaymie Wilhelm is a 33 y.o. female with a PMH  has a past medical history of Anxiety disorder, Chronic hepatitis C without hepatic coma (12/31/2018), Chronic hypertension (2/12/2019), Drug abuse, First degree perineal laceration during delivery (1/22/2015), Gestational diabetes, Hepatitis C (2016), Iron deficiency anemia secondary to inadequate dietary iron intake (11/5/2019), Seizures, and Thyroid disease.  Presented to the ER for evaluation of worsening left lower extremity pain, redness, and swelling.  Patient was [facility] at [facility] yesterday and was discharged with cellulitis and prescribed Keflex, Bactrim, Norco.  Patient reports taking 1 dose of each of the antibiotics earlier today, but presented to the ER this evening for worsening symptoms.  Patient states she was working [place] in the [place] a [place] ago and felt like something bit her in her leg.  Patient was not able to identify what bit her.  Denies any numbness/tingling distal to wound.  Denies any other associated symptoms such as fever, aches, chills, sweats, nausea, vomiting headache lightheadedness, dizziness, chest pain, palpitations, shortness of breath, dyspnea exertion, abdominal pain common bladder/bowel complaints, or any other symptoms ER workup unremarkable with the exception to elevated CRP of 112.  X-ray of left tib-fib was negative for acute findings.  Blood cultures obtained x2.  Patient initiated on vancomycin in ED. hospital Medicine consulted to  admit patient for IV antibiotic therapy for left lower extremity cellulitis.  Patient in agreement with treatment plan.  Patient will be admitted under observation status.    PCP: Jak Engel        * No surgery found *      Hospital Course:   33 y.o. female with a PMH of Anxiety disorder, Hep C, HTN, Drug abuse ( meth), Gestational diabetes, Iron deficiency anemia secondary to inadequate dietary iron intake, Seizures, and Thyroid disease. Presented to the ER for evaluation of worsening left lower extremity pain, redness, and swelling. Patient was seen at [facility] yesterday and was discharged with cellulitis and prescribed Keflex, Bactrim, Norco. Patient reports taking 1 dose of each of the antibiotics earlier today, but presented to the ER this evening for worsening symptoms.  Patient states she was [place] and felt like something bit her in her leg.  Patient was not able to identify what bit her. Denies any numbness/tingling distal to wound, no rash around the wound. ER workup unremarkable except elevated .  X-ray of left tib-fib was negative for acute findings. Blood cultures obtained x2. Patient initiated on IV Vancomycin in ED and admitted for Cellulitis/Abscess LLE.       Feels better, pain, redness LLE getting slowly better, +ve tenderness but no fluctuation. Elevated LLE with pillows. Cont IV Vanc.     9/15- Appreciate ortho input- looks and feels better, LLE redness, swelling, tenderness improving, eschar getting smaller. Cont IV vanc and elevation LLE, no abscess. Cr 0.7.    9/16- looks and feels much better. LLE swelling, redness, tenderness improving, eschar also better, no abscess or pus present. VSS, Afeb. She received her IV Vanco and then was switched to oral Augmentin 875 mg PO BID which she will take for 7 days more and apply neosporin ointment to L leg tid. She is eating drinking well, walking around well. She was seen and examined and deemed stable  for discharge home today.        Goals of Care Treatment Preferences:  Code Status: Full Code      Consults:     No new Assessment & Plan notes have been filed under this hospital service since the last note was generated.  Service: Hospital Medicine    Final Active Diagnoses:    Diagnosis Date Noted POA    Chronic hypertension [I10] 02/12/2019 Yes      Problems Resolved During this Admission:    Diagnosis Date Noted Date Resolved POA    PRINCIPAL PROBLEM:  Cellulitis of left lower leg [L03.116] 09/14/2023 09/16/2023 Yes       Discharged Condition: stable    Disposition: Home or Self Care    Follow Up:   Follow-up Information     Jak Engel MD. Schedule an appointment as soon as possible for a visit in 3 day(s).    Specialty: Family Medicine  Why: As needed, Hospital follow up  Contact information:  [address]  [place] [facility] [address]  [phone]                       Patient Instructions:      Diet Cardiac     Activity as tolerated       Significant Diagnostic Studies: Labs:   BMP:   Recent Labs   Lab 09/16/23  0533   CREATININE 0.6   , CMP   Recent Labs   Lab 09/16/23  0533   CREATININE 0.6     All labs within the past 24 hours have been reviewed  Microbiology:   Blood Culture   Lab Results   Component Value Date    LABBLOO No Growth to date 09/13/2023    LABBLOO No Growth to date 09/13/2023    LABBLOO No Growth to date 09/13/2023    LABBLOO No Growth to date 09/13/2023       Pending Diagnostic Studies:     None         Medications:  Reconciled Home Medications:      Medication List      START taking these medications    amoxicillin-clavulanate 875-125mg 875-125 mg per tablet  Commonly known as: AUGMENTIN  Take 1 tablet by mouth every 12 (twelve) hours.        CONTINUE taking these medications    amLODIPine 5 MG tablet  Commonly known as: NORVASC  Take 5 mg by mouth once daily.            Indwelling Lines/Drains at time of discharge:   Lines/Drains/Airways     None                 Time  spent on the discharge of patient: 45 minutes         Opal Zaragoza MD  Department of Hospital Medicine  [facility] - Telemetry ([facility])

## 2023-09-19 LAB
BACTERIA BLD CULT: NORMAL
BACTERIA BLD CULT: NORMAL

## 2023-09-20 ENCOUNTER — PATIENT MESSAGE (OUTPATIENT)
Dept: ADMINISTRATIVE | Facility: CLINIC | Age: 33
End: 2023-09-20
Payer: MEDICAID

## 2023-09-20 ENCOUNTER — PATIENT OUTREACH (OUTPATIENT)
Dept: ADMINISTRATIVE | Facility: CLINIC | Age: 33
End: 2023-09-20
Payer: MEDICAID

## 2023-09-21 NOTE — PROGRESS NOTES
C3 nurse spoke with Jaymie Wilhelm   for a TCC post hospital discharge follow up call. The patient reports does not have a scheduled HOSFU appointment. C3 nurse was unable to schedule [facility]. Patient advised to contact their PCP to schedule a HOSPFU within 5-7 days.

## 2024-09-21 ENCOUNTER — HOSPITAL ENCOUNTER (EMERGENCY)
Facility: HOSPITAL | Age: 34
Discharge: LEFT BEFORE DISCHARGE | End: 2024-09-21
Attending: EMERGENCY MEDICINE
Payer: MEDICAID

## 2024-09-21 VITALS
HEART RATE: 69 BPM | TEMPERATURE: 98 F | OXYGEN SATURATION: 99 % | SYSTOLIC BLOOD PRESSURE: 119 MMHG | DIASTOLIC BLOOD PRESSURE: 81 MMHG | WEIGHT: 120 LBS | RESPIRATION RATE: 17 BRPM | HEIGHT: 61 IN | BODY MASS INDEX: 22.66 KG/M2

## 2024-09-21 DIAGNOSIS — F15.90 AMPHETAMINE USE: ICD-10-CM

## 2024-09-21 DIAGNOSIS — T40.1X1A HEROIN OVERDOSE, ACCIDENTAL OR UNINTENTIONAL, INITIAL ENCOUNTER: Primary | ICD-10-CM

## 2024-09-21 LAB
ALBUMIN SERPL BCP-MCNC: 4 G/DL (ref 3.5–5.2)
ALLENS TEST: ABNORMAL
ALP SERPL-CCNC: 61 U/L (ref 55–135)
ALT SERPL W/O P-5'-P-CCNC: 15 U/L (ref 10–44)
AMORPH CRY URNS QL MICRO: NORMAL
AMPHET+METHAMPHET UR QL: ABNORMAL
ANION GAP SERPL CALC-SCNC: 10 MMOL/L (ref 8–16)
ANION GAP SERPL CALC-SCNC: 12 MMOL/L (ref 8–16)
APAP SERPL-MCNC: <3 UG/ML (ref 10–20)
AST SERPL-CCNC: 48 U/L (ref 10–40)
B-HCG UR QL: NEGATIVE
BACTERIA #/AREA URNS HPF: NORMAL /HPF
BARBITURATES UR QL SCN>200 NG/ML: NEGATIVE
BASOPHILS # BLD AUTO: 0.06 K/UL (ref 0–0.2)
BASOPHILS NFR BLD: 0.5 % (ref 0–1.9)
BENZODIAZ UR QL SCN>200 NG/ML: ABNORMAL
BILIRUB SERPL-MCNC: 0.3 MG/DL (ref 0.1–1)
BILIRUB UR QL STRIP: NEGATIVE
BUN SERPL-MCNC: 10 MG/DL (ref 6–20)
BUN SERPL-MCNC: 10 MG/DL (ref 6–20)
BZE UR QL SCN: NEGATIVE
CALCIUM SERPL-MCNC: 8.9 MG/DL (ref 8.7–10.5)
CALCIUM SERPL-MCNC: 8.9 MG/DL (ref 8.7–10.5)
CANNABINOIDS UR QL SCN: NEGATIVE
CHLORIDE SERPL-SCNC: 107 MMOL/L (ref 95–110)
CHLORIDE SERPL-SCNC: 108 MMOL/L (ref 95–110)
CLARITY UR: ABNORMAL
CO2 SERPL-SCNC: 24 MMOL/L (ref 23–29)
CO2 SERPL-SCNC: 25 MMOL/L (ref 23–29)
COLOR UR: YELLOW
CREAT SERPL-MCNC: 0.8 MG/DL (ref 0.5–1.4)
CREAT SERPL-MCNC: 0.9 MG/DL (ref 0.5–1.4)
CREAT UR-MCNC: 141.6 MG/DL (ref 15–325)
DELSYS: ABNORMAL
DIFFERENTIAL METHOD BLD: ABNORMAL
EOSINOPHIL # BLD AUTO: 0 K/UL (ref 0–0.5)
EOSINOPHIL NFR BLD: 0.3 % (ref 0–8)
ERYTHROCYTE [DISTWIDTH] IN BLOOD BY AUTOMATED COUNT: 12.8 % (ref 11.5–14.5)
EST. GFR  (NO RACE VARIABLE): >60 ML/MIN/1.73 M^2
EST. GFR  (NO RACE VARIABLE): >60 ML/MIN/1.73 M^2
ETHANOL SERPL-MCNC: <10 MG/DL
FIO2: 21
GLUCOSE SERPL-MCNC: 91 MG/DL (ref 70–110)
GLUCOSE SERPL-MCNC: 95 MG/DL (ref 70–110)
GLUCOSE SERPL-MCNC: 98 MG/DL (ref 70–110)
GLUCOSE UR QL STRIP: NEGATIVE
HCO3 UR-SCNC: 27 MMOL/L (ref 24–28)
HCT VFR BLD AUTO: 42.1 % (ref 37–48.5)
HCT VFR BLD CALC: 43 %PCV (ref 36–54)
HGB BLD-MCNC: 13.9 G/DL (ref 12–16)
HGB UR QL STRIP: NEGATIVE
HYALINE CASTS #/AREA URNS LPF: 0 /LPF
IMM GRANULOCYTES # BLD AUTO: 0.04 K/UL (ref 0–0.04)
IMM GRANULOCYTES NFR BLD AUTO: 0.3 % (ref 0–0.5)
KETONES UR QL STRIP: NEGATIVE
LEUKOCYTE ESTERASE UR QL STRIP: NEGATIVE
LYMPHOCYTES # BLD AUTO: 1.5 K/UL (ref 1–4.8)
LYMPHOCYTES NFR BLD: 12 % (ref 18–48)
MCH RBC QN AUTO: 28.9 PG (ref 27–31)
MCHC RBC AUTO-ENTMCNC: 33 G/DL (ref 32–36)
MCV RBC AUTO: 88 FL (ref 82–98)
METHADONE UR QL SCN>300 NG/ML: NEGATIVE
MICROSCOPIC COMMENT: NORMAL
MODE: ABNORMAL
MONOCYTES # BLD AUTO: 1.2 K/UL (ref 0.3–1)
MONOCYTES NFR BLD: 9.3 % (ref 4–15)
NEUTROPHILS # BLD AUTO: 9.9 K/UL (ref 1.8–7.7)
NEUTROPHILS NFR BLD: 77.6 % (ref 38–73)
NITRITE UR QL STRIP: NEGATIVE
NRBC BLD-RTO: 0 /100 WBC
OPIATES UR QL SCN: ABNORMAL
PCO2 BLDA: 50.1 MMHG (ref 35–45)
PCP UR QL SCN>25 NG/ML: NEGATIVE
PH SMN: 7.34 [PH] (ref 7.35–7.45)
PH UR STRIP: 8 [PH] (ref 5–8)
PLATELET # BLD AUTO: 290 K/UL (ref 150–450)
PMV BLD AUTO: 10.7 FL (ref 9.2–12.9)
PO2 BLDA: 92 MMHG (ref 80–100)
POC BE: 1 MMOL/L
POC IONIZED CALCIUM: 1.19 MMOL/L (ref 1.06–1.42)
POC SATURATED O2: 96 % (ref 95–100)
POTASSIUM BLD-SCNC: 4.1 MMOL/L (ref 3.5–5.1)
POTASSIUM SERPL-SCNC: 4.1 MMOL/L (ref 3.5–5.1)
POTASSIUM SERPL-SCNC: 5.8 MMOL/L (ref 3.5–5.1)
PROT SERPL-MCNC: 7.7 G/DL (ref 6–8.4)
PROT UR QL STRIP: ABNORMAL
RBC # BLD AUTO: 4.81 M/UL (ref 4–5.4)
RBC #/AREA URNS HPF: 0 /HPF (ref 0–4)
SAMPLE: ABNORMAL
SITE: ABNORMAL
SODIUM BLD-SCNC: 141 MMOL/L (ref 136–145)
SODIUM SERPL-SCNC: 142 MMOL/L (ref 136–145)
SODIUM SERPL-SCNC: 144 MMOL/L (ref 136–145)
SP GR UR STRIP: 1.02 (ref 1–1.03)
SQUAMOUS #/AREA URNS HPF: 2 /HPF
TOXICOLOGY INFORMATION: ABNORMAL
TSH SERPL DL<=0.005 MIU/L-ACNC: 0.68 UIU/ML (ref 0.4–4)
URN SPEC COLLECT METH UR: ABNORMAL
UROBILINOGEN UR STRIP-ACNC: >=8 EU/DL
WBC # BLD AUTO: 12.77 K/UL (ref 3.9–12.7)
WBC #/AREA URNS HPF: 0 /HPF (ref 0–5)

## 2024-09-21 PROCEDURE — 96374 THER/PROPH/DIAG INJ IV PUSH: CPT

## 2024-09-21 PROCEDURE — 36600 WITHDRAWAL OF ARTERIAL BLOOD: CPT

## 2024-09-21 PROCEDURE — 80143 DRUG ASSAY ACETAMINOPHEN: CPT | Performed by: EMERGENCY MEDICINE

## 2024-09-21 PROCEDURE — 82077 ASSAY SPEC XCP UR&BREATH IA: CPT | Performed by: EMERGENCY MEDICINE

## 2024-09-21 PROCEDURE — 63600175 PHARM REV CODE 636 W HCPCS: Performed by: EMERGENCY MEDICINE

## 2024-09-21 PROCEDURE — 85014 HEMATOCRIT: CPT

## 2024-09-21 PROCEDURE — 84132 ASSAY OF SERUM POTASSIUM: CPT

## 2024-09-21 PROCEDURE — 82330 ASSAY OF CALCIUM: CPT

## 2024-09-21 PROCEDURE — 84295 ASSAY OF SERUM SODIUM: CPT

## 2024-09-21 PROCEDURE — 82803 BLOOD GASES ANY COMBINATION: CPT

## 2024-09-21 PROCEDURE — 25000003 PHARM REV CODE 250: Performed by: EMERGENCY MEDICINE

## 2024-09-21 PROCEDURE — 99900035 HC TECH TIME PER 15 MIN (STAT)

## 2024-09-21 PROCEDURE — 81025 URINE PREGNANCY TEST: CPT | Performed by: EMERGENCY MEDICINE

## 2024-09-21 PROCEDURE — 84443 ASSAY THYROID STIM HORMONE: CPT | Performed by: EMERGENCY MEDICINE

## 2024-09-21 PROCEDURE — 80048 BASIC METABOLIC PNL TOTAL CA: CPT | Mod: XB | Performed by: EMERGENCY MEDICINE

## 2024-09-21 PROCEDURE — 80307 DRUG TEST PRSMV CHEM ANLYZR: CPT | Performed by: EMERGENCY MEDICINE

## 2024-09-21 PROCEDURE — 81000 URINALYSIS NONAUTO W/SCOPE: CPT | Performed by: EMERGENCY MEDICINE

## 2024-09-21 PROCEDURE — 99285 EMERGENCY DEPT VISIT HI MDM: CPT | Mod: 25

## 2024-09-21 PROCEDURE — 80053 COMPREHEN METABOLIC PANEL: CPT | Performed by: EMERGENCY MEDICINE

## 2024-09-21 PROCEDURE — 85025 COMPLETE CBC W/AUTO DIFF WBC: CPT | Performed by: EMERGENCY MEDICINE

## 2024-09-21 PROCEDURE — 96361 HYDRATE IV INFUSION ADD-ON: CPT

## 2024-09-21 RX ORDER — ONDANSETRON HYDROCHLORIDE 2 MG/ML
4 INJECTION, SOLUTION INTRAVENOUS
Status: COMPLETED | OUTPATIENT
Start: 2024-09-21 | End: 2024-09-21

## 2024-09-21 RX ORDER — ACETAMINOPHEN 500 MG
1000 TABLET ORAL
Status: COMPLETED | OUTPATIENT
Start: 2024-09-21 | End: 2024-09-21

## 2024-09-21 RX ADMIN — ACETAMINOPHEN 1000 MG: 500 TABLET ORAL at 06:09

## 2024-09-21 RX ADMIN — ONDANSETRON 4 MG: 2 INJECTION INTRAMUSCULAR; INTRAVENOUS at 06:09

## 2024-09-21 RX ADMIN — SODIUM CHLORIDE 1000 ML: 9 INJECTION, SOLUTION INTRAVENOUS at 06:09
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
## 2024-09-22 NOTE — ED NOTES
Multiple unsuccessful attempts to contact pt through contacts listed in chart. MD & primary nurse aware

## 2024-09-22 NOTE — ED NOTES
Attempted to call both numbers listed on chart to have patient return to ED for IV removal, unsuccessful

## 2024-09-23 LAB
OHS QRS DURATION: 74 MS
OHS QTC CALCULATION: 483 MS

## 2024-09-27 VITALS
HEIGHT: 61 IN | HEART RATE: 103 BPM | DIASTOLIC BLOOD PRESSURE: 78 MMHG | WEIGHT: 117.94 LBS | SYSTOLIC BLOOD PRESSURE: 124 MMHG | TEMPERATURE: 98 F | RESPIRATION RATE: 16 BRPM | BODY MASS INDEX: 22.27 KG/M2 | OXYGEN SATURATION: 99 %

## 2024-09-27 PROCEDURE — 99900041 HC LEFT WITHOUT BEING SEEN- EMERGENCY

## 2024-09-27 PROCEDURE — 93010 ELECTROCARDIOGRAM REPORT: CPT | Mod: ,,, | Performed by: INTERNAL MEDICINE

## 2024-09-27 PROCEDURE — 93005 ELECTROCARDIOGRAM TRACING: CPT

## 2024-09-28 ENCOUNTER — HOSPITAL ENCOUNTER (EMERGENCY)
Facility: HOSPITAL | Age: 34
Discharge: LEFT BEFORE DISCHARGE | End: 2024-09-28
Payer: MEDICAID

## 2024-09-28 DIAGNOSIS — R07.9 CHEST PAIN: ICD-10-CM

## 2024-09-28 LAB
B-HCG UR QL: NEGATIVE
BILIRUB UR QL STRIP: NEGATIVE
CLARITY UR: CLEAR
COLOR UR: YELLOW
GLUCOSE UR QL STRIP: NEGATIVE
HGB UR QL STRIP: NEGATIVE
KETONES UR QL STRIP: NEGATIVE
LEUKOCYTE ESTERASE UR QL STRIP: NEGATIVE
NITRITE UR QL STRIP: NEGATIVE
OHS QRS DURATION: 86 MS
OHS QTC CALCULATION: 456 MS
PH UR STRIP: 6 [PH] (ref 5–8)
PROT UR QL STRIP: NEGATIVE
SP GR UR STRIP: 1.02 (ref 1–1.03)
URN SPEC COLLECT METH UR: NORMAL
UROBILINOGEN UR STRIP-ACNC: NEGATIVE EU/DL

## 2024-09-28 PROCEDURE — 81025 URINE PREGNANCY TEST: CPT | Performed by: EMERGENCY MEDICINE

## 2024-09-28 PROCEDURE — 81003 URINALYSIS AUTO W/O SCOPE: CPT | Performed by: EMERGENCY MEDICINE

## 2024-11-05 ENCOUNTER — PATIENT MESSAGE (OUTPATIENT)
Dept: RESEARCH | Facility: HOSPITAL | Age: 34
End: 2024-11-05
Payer: MEDICAID